# Patient Record
Sex: FEMALE | Race: BLACK OR AFRICAN AMERICAN | Employment: UNEMPLOYED | URBAN - METROPOLITAN AREA
[De-identification: names, ages, dates, MRNs, and addresses within clinical notes are randomized per-mention and may not be internally consistent; named-entity substitution may affect disease eponyms.]

---

## 2021-08-06 PROBLEM — E11.40 DIABETIC NEUROPATHY ASSOCIATED WITH TYPE 2 DIABETES MELLITUS (HCC): Status: ACTIVE | Noted: 2021-08-06

## 2021-08-06 PROBLEM — E11.40 TYPE 2 DIABETES MELLITUS WITH DIABETIC NEUROPATHY, WITHOUT LONG-TERM CURRENT USE OF INSULIN (HCC): Status: ACTIVE | Noted: 2021-08-06

## 2024-03-17 ENCOUNTER — HOSPITAL ENCOUNTER (OUTPATIENT)
Facility: HOSPITAL | Age: 69
Setting detail: OBSERVATION
Discharge: HOME | End: 2024-03-21
Attending: EMERGENCY MEDICINE | Admitting: STUDENT IN AN ORGANIZED HEALTH CARE EDUCATION/TRAINING PROGRAM
Payer: MEDICARE

## 2024-03-17 ENCOUNTER — APPOINTMENT (OUTPATIENT)
Dept: CT IMAGING | Facility: HOSPITAL | Age: 69
End: 2024-03-17
Attending: EMERGENCY MEDICINE
Payer: MEDICARE

## 2024-03-17 DIAGNOSIS — N28.9 RENAL INSUFFICIENCY: ICD-10-CM

## 2024-03-17 DIAGNOSIS — N39.0 URINARY TRACT INFECTION WITHOUT HEMATURIA, SITE UNSPECIFIED: ICD-10-CM

## 2024-03-17 DIAGNOSIS — N20.0 KIDNEY STONE: Primary | ICD-10-CM

## 2024-03-17 LAB
ALBUMIN SERPL-MCNC: 3.8 G/DL (ref 3.4–5)
ALBUMIN/GLOB SERPL: 0.9 {RATIO} (ref 1–2)
ALP LIVER SERPL-CCNC: 98 U/L
ALT SERPL-CCNC: 18 U/L
ANION GAP SERPL CALC-SCNC: 1 MMOL/L (ref 0–18)
AST SERPL-CCNC: 40 U/L (ref 15–37)
BASOPHILS # BLD AUTO: 0.03 X10(3) UL (ref 0–0.2)
BASOPHILS NFR BLD AUTO: 0.3 %
BILIRUB SERPL-MCNC: 1.1 MG/DL (ref 0.1–2)
BILIRUB UR QL STRIP.AUTO: NEGATIVE
BUN BLD-MCNC: 35 MG/DL (ref 9–23)
CALCIUM BLD-MCNC: 9.9 MG/DL (ref 8.5–10.1)
CHLORIDE SERPL-SCNC: 107 MMOL/L (ref 98–112)
CO2 SERPL-SCNC: 26 MMOL/L (ref 21–32)
COLOR UR AUTO: YELLOW
CREAT BLD-MCNC: 1.36 MG/DL
EGFRCR SERPLBLD CKD-EPI 2021: 43 ML/MIN/1.73M2 (ref 60–?)
EOSINOPHIL # BLD AUTO: 0.06 X10(3) UL (ref 0–0.7)
EOSINOPHIL NFR BLD AUTO: 0.7 %
ERYTHROCYTE [DISTWIDTH] IN BLOOD BY AUTOMATED COUNT: 14.3 %
GLOBULIN PLAS-MCNC: 4.4 G/DL (ref 2.8–4.4)
GLUCOSE BLD-MCNC: 103 MG/DL (ref 70–99)
GLUCOSE BLD-MCNC: 97 MG/DL (ref 70–99)
GLUCOSE UR STRIP.AUTO-MCNC: NORMAL MG/DL
HCT VFR BLD AUTO: 43.8 %
HGB BLD-MCNC: 13.8 G/DL
HYALINE CASTS #/AREA URNS AUTO: PRESENT /LPF
IMM GRANULOCYTES # BLD AUTO: 0.04 X10(3) UL (ref 0–1)
IMM GRANULOCYTES NFR BLD: 0.4 %
KETONES UR STRIP.AUTO-MCNC: NEGATIVE MG/DL
LEUKOCYTE ESTERASE UR QL STRIP.AUTO: 500
LYMPHOCYTES # BLD AUTO: 3.7 X10(3) UL (ref 1–4)
LYMPHOCYTES NFR BLD AUTO: 41.1 %
MCH RBC QN AUTO: 26.6 PG (ref 26–34)
MCHC RBC AUTO-ENTMCNC: 31.5 G/DL (ref 31–37)
MCV RBC AUTO: 84.4 FL
MONOCYTES # BLD AUTO: 0.83 X10(3) UL (ref 0.1–1)
MONOCYTES NFR BLD AUTO: 9.2 %
NEUTROPHILS # BLD AUTO: 4.34 X10 (3) UL (ref 1.5–7.7)
NEUTROPHILS # BLD AUTO: 4.34 X10(3) UL (ref 1.5–7.7)
NEUTROPHILS NFR BLD AUTO: 48.3 %
NITRITE UR QL STRIP.AUTO: NEGATIVE
OSMOLALITY SERPL CALC.SUM OF ELEC: 286 MOSM/KG (ref 275–295)
PH UR STRIP.AUTO: 5.5 [PH] (ref 5–8)
PLATELET # BLD AUTO: 288 10(3)UL (ref 150–450)
POTASSIUM SERPL-SCNC: 5.2 MMOL/L (ref 3.5–5.1)
PROT SERPL-MCNC: 8.2 G/DL (ref 6.4–8.2)
PROT UR STRIP.AUTO-MCNC: 50 MG/DL
RBC # BLD AUTO: 5.19 X10(6)UL
RBC #/AREA URNS AUTO: >10 /HPF
SODIUM SERPL-SCNC: 134 MMOL/L (ref 136–145)
SP GR UR STRIP.AUTO: 1.02 (ref 1–1.03)
UROBILINOGEN UR STRIP.AUTO-MCNC: 3 MG/DL
WBC # BLD AUTO: 9 X10(3) UL (ref 4–11)
WBC #/AREA URNS AUTO: >50 /HPF

## 2024-03-17 PROCEDURE — 87186 SC STD MICRODIL/AGAR DIL: CPT | Performed by: PHYSICIAN ASSISTANT

## 2024-03-17 PROCEDURE — 85025 COMPLETE CBC W/AUTO DIFF WBC: CPT

## 2024-03-17 PROCEDURE — 87086 URINE CULTURE/COLONY COUNT: CPT | Performed by: PHYSICIAN ASSISTANT

## 2024-03-17 PROCEDURE — 74177 CT ABD & PELVIS W/CONTRAST: CPT | Performed by: EMERGENCY MEDICINE

## 2024-03-17 PROCEDURE — 87077 CULTURE AEROBIC IDENTIFY: CPT | Performed by: PHYSICIAN ASSISTANT

## 2024-03-17 PROCEDURE — 85025 COMPLETE CBC W/AUTO DIFF WBC: CPT | Performed by: EMERGENCY MEDICINE

## 2024-03-17 PROCEDURE — 80053 COMPREHEN METABOLIC PANEL: CPT

## 2024-03-17 PROCEDURE — 99285 EMERGENCY DEPT VISIT HI MDM: CPT

## 2024-03-17 PROCEDURE — 96365 THER/PROPH/DIAG IV INF INIT: CPT

## 2024-03-17 PROCEDURE — 96361 HYDRATE IV INFUSION ADD-ON: CPT

## 2024-03-17 PROCEDURE — 96375 TX/PRO/DX INJ NEW DRUG ADDON: CPT

## 2024-03-17 PROCEDURE — 81001 URINALYSIS AUTO W/SCOPE: CPT | Performed by: EMERGENCY MEDICINE

## 2024-03-17 PROCEDURE — 82962 GLUCOSE BLOOD TEST: CPT

## 2024-03-17 PROCEDURE — 81001 URINALYSIS AUTO W/SCOPE: CPT

## 2024-03-17 PROCEDURE — 80053 COMPREHEN METABOLIC PANEL: CPT | Performed by: EMERGENCY MEDICINE

## 2024-03-17 RX ORDER — IOHEXOL 350 MG/ML
80 INJECTION, SOLUTION INTRAVENOUS
Status: COMPLETED | OUTPATIENT
Start: 2024-03-17 | End: 2024-03-17

## 2024-03-17 RX ORDER — ONDANSETRON 2 MG/ML
4 INJECTION INTRAMUSCULAR; INTRAVENOUS ONCE
Status: COMPLETED | OUTPATIENT
Start: 2024-03-17 | End: 2024-03-17

## 2024-03-17 RX ORDER — METOCLOPRAMIDE HYDROCHLORIDE 5 MG/ML
10 INJECTION INTRAMUSCULAR; INTRAVENOUS ONCE
Status: COMPLETED | OUTPATIENT
Start: 2024-03-17 | End: 2024-03-17

## 2024-03-17 RX ORDER — DIPHENHYDRAMINE HYDROCHLORIDE 50 MG/ML
25 INJECTION INTRAMUSCULAR; INTRAVENOUS ONCE
Status: COMPLETED | OUTPATIENT
Start: 2024-03-17 | End: 2024-03-17

## 2024-03-18 ENCOUNTER — APPOINTMENT (OUTPATIENT)
Dept: INTERVENTIONAL RADIOLOGY/VASCULAR | Facility: HOSPITAL | Age: 69
End: 2024-03-18
Attending: PHYSICIAN ASSISTANT
Payer: MEDICARE

## 2024-03-18 PROBLEM — N28.9 RENAL INSUFFICIENCY: Status: ACTIVE | Noted: 2024-03-18

## 2024-03-18 PROBLEM — N39.0 URINARY TRACT INFECTION WITHOUT HEMATURIA, SITE UNSPECIFIED: Status: ACTIVE | Noted: 2024-03-18

## 2024-03-18 LAB
ANION GAP SERPL CALC-SCNC: 2 MMOL/L (ref 0–18)
BASOPHILS # BLD AUTO: 0.03 X10(3) UL (ref 0–0.2)
BASOPHILS NFR BLD AUTO: 0.4 %
BUN BLD-MCNC: 29 MG/DL (ref 9–23)
CALCIUM BLD-MCNC: 8.9 MG/DL (ref 8.5–10.1)
CHLORIDE SERPL-SCNC: 114 MMOL/L (ref 98–112)
CO2 SERPL-SCNC: 26 MMOL/L (ref 21–32)
CREAT BLD-MCNC: 1.08 MG/DL
EGFRCR SERPLBLD CKD-EPI 2021: 57 ML/MIN/1.73M2 (ref 60–?)
EOSINOPHIL # BLD AUTO: 0.12 X10(3) UL (ref 0–0.7)
EOSINOPHIL NFR BLD AUTO: 1.7 %
ERYTHROCYTE [DISTWIDTH] IN BLOOD BY AUTOMATED COUNT: 14.3 %
GLUCOSE BLD-MCNC: 75 MG/DL (ref 70–99)
GLUCOSE BLD-MCNC: 89 MG/DL (ref 70–99)
GLUCOSE BLD-MCNC: 89 MG/DL (ref 70–99)
GLUCOSE BLD-MCNC: 90 MG/DL (ref 70–99)
GLUCOSE BLD-MCNC: 91 MG/DL (ref 70–99)
HCT VFR BLD AUTO: 34.8 %
HGB BLD-MCNC: 11.1 G/DL
IMM GRANULOCYTES # BLD AUTO: 0.02 X10(3) UL (ref 0–1)
IMM GRANULOCYTES NFR BLD: 0.3 %
INR BLD: 1.21 (ref 0.8–1.2)
LYMPHOCYTES # BLD AUTO: 2.65 X10(3) UL (ref 1–4)
LYMPHOCYTES NFR BLD AUTO: 36.9 %
MAGNESIUM SERPL-MCNC: 2.2 MG/DL (ref 1.6–2.6)
MCH RBC QN AUTO: 26.9 PG (ref 26–34)
MCHC RBC AUTO-ENTMCNC: 31.9 G/DL (ref 31–37)
MCV RBC AUTO: 84.5 FL
MONOCYTES # BLD AUTO: 0.8 X10(3) UL (ref 0.1–1)
MONOCYTES NFR BLD AUTO: 11.1 %
NEUTROPHILS # BLD AUTO: 3.56 X10 (3) UL (ref 1.5–7.7)
NEUTROPHILS # BLD AUTO: 3.56 X10(3) UL (ref 1.5–7.7)
NEUTROPHILS NFR BLD AUTO: 49.6 %
OSMOLALITY SERPL CALC.SUM OF ELEC: 299 MOSM/KG (ref 275–295)
PLATELET # BLD AUTO: 230 10(3)UL (ref 150–450)
POTASSIUM SERPL-SCNC: 3.8 MMOL/L (ref 3.5–5.1)
PROTHROMBIN TIME: 15.4 SECONDS (ref 11.6–14.8)
RBC # BLD AUTO: 4.12 X10(6)UL
SODIUM SERPL-SCNC: 142 MMOL/L (ref 136–145)
WBC # BLD AUTO: 7.2 X10(3) UL (ref 4–11)

## 2024-03-18 PROCEDURE — 96361 HYDRATE IV INFUSION ADD-ON: CPT

## 2024-03-18 PROCEDURE — 82962 GLUCOSE BLOOD TEST: CPT

## 2024-03-18 PROCEDURE — 96376 TX/PRO/DX INJ SAME DRUG ADON: CPT

## 2024-03-18 PROCEDURE — 99153 MOD SED SAME PHYS/QHP EA: CPT | Performed by: STUDENT IN AN ORGANIZED HEALTH CARE EDUCATION/TRAINING PROGRAM

## 2024-03-18 PROCEDURE — 80048 BASIC METABOLIC PNL TOTAL CA: CPT | Performed by: HOSPITALIST

## 2024-03-18 PROCEDURE — 0T25X0Z CHANGE DRAINAGE DEVICE IN KIDNEY, EXTERNAL APPROACH: ICD-10-PCS | Performed by: STUDENT IN AN ORGANIZED HEALTH CARE EDUCATION/TRAINING PROGRAM

## 2024-03-18 PROCEDURE — 83735 ASSAY OF MAGNESIUM: CPT | Performed by: HOSPITALIST

## 2024-03-18 PROCEDURE — 85610 PROTHROMBIN TIME: CPT | Performed by: STUDENT IN AN ORGANIZED HEALTH CARE EDUCATION/TRAINING PROGRAM

## 2024-03-18 PROCEDURE — 50432 PLMT NEPHROSTOMY CATHETER: CPT | Performed by: STUDENT IN AN ORGANIZED HEALTH CARE EDUCATION/TRAINING PROGRAM

## 2024-03-18 PROCEDURE — 96375 TX/PRO/DX INJ NEW DRUG ADDON: CPT

## 2024-03-18 PROCEDURE — 96366 THER/PROPH/DIAG IV INF ADDON: CPT

## 2024-03-18 PROCEDURE — 85025 COMPLETE CBC W/AUTO DIFF WBC: CPT | Performed by: HOSPITALIST

## 2024-03-18 PROCEDURE — 99152 MOD SED SAME PHYS/QHP 5/>YRS: CPT | Performed by: STUDENT IN AN ORGANIZED HEALTH CARE EDUCATION/TRAINING PROGRAM

## 2024-03-18 RX ORDER — MORPHINE SULFATE 2 MG/ML
2 INJECTION, SOLUTION INTRAMUSCULAR; INTRAVENOUS EVERY 2 HOUR PRN
Status: DISCONTINUED | OUTPATIENT
Start: 2024-03-18 | End: 2024-03-21

## 2024-03-18 RX ORDER — IODIXANOL 320 MG/ML
75 INJECTION, SOLUTION INTRAVASCULAR
Status: COMPLETED | OUTPATIENT
Start: 2024-03-18 | End: 2024-03-18

## 2024-03-18 RX ORDER — LIDOCAINE HYDROCHLORIDE 10 MG/ML
INJECTION, SOLUTION INFILTRATION; PERINEURAL
Status: COMPLETED
Start: 2024-03-18 | End: 2024-03-18

## 2024-03-18 RX ORDER — MORPHINE SULFATE 4 MG/ML
4 INJECTION, SOLUTION INTRAMUSCULAR; INTRAVENOUS EVERY 2 HOUR PRN
Status: DISCONTINUED | OUTPATIENT
Start: 2024-03-18 | End: 2024-03-21

## 2024-03-18 RX ORDER — NICOTINE POLACRILEX 4 MG
30 LOZENGE BUCCAL
Status: DISCONTINUED | OUTPATIENT
Start: 2024-03-18 | End: 2024-03-21

## 2024-03-18 RX ORDER — SENNOSIDES 8.6 MG
17.2 TABLET ORAL NIGHTLY PRN
Status: DISCONTINUED | OUTPATIENT
Start: 2024-03-18 | End: 2024-03-21

## 2024-03-18 RX ORDER — ACETAMINOPHEN 500 MG
1000 TABLET ORAL EVERY 6 HOURS PRN
Status: DISCONTINUED | OUTPATIENT
Start: 2024-03-18 | End: 2024-03-21

## 2024-03-18 RX ORDER — POLYETHYLENE GLYCOL 3350 17 G/17G
17 POWDER, FOR SOLUTION ORAL DAILY PRN
Status: DISCONTINUED | OUTPATIENT
Start: 2024-03-18 | End: 2024-03-21

## 2024-03-18 RX ORDER — BISACODYL 10 MG
10 SUPPOSITORY, RECTAL RECTAL
Status: DISCONTINUED | OUTPATIENT
Start: 2024-03-18 | End: 2024-03-21

## 2024-03-18 RX ORDER — MIDAZOLAM HYDROCHLORIDE 1 MG/ML
INJECTION INTRAMUSCULAR; INTRAVENOUS
Status: COMPLETED
Start: 2024-03-18 | End: 2024-03-18

## 2024-03-18 RX ORDER — DEXTROSE MONOHYDRATE 25 G/50ML
50 INJECTION, SOLUTION INTRAVENOUS
Status: DISCONTINUED | OUTPATIENT
Start: 2024-03-18 | End: 2024-03-21

## 2024-03-18 RX ORDER — METFORMIN HYDROCHLORIDE 500 MG/1
500 TABLET, EXTENDED RELEASE ORAL 2 TIMES DAILY
Status: ON HOLD | COMMUNITY
End: 2024-04-08 | Stop reason: CLARIF

## 2024-03-18 RX ORDER — ONDANSETRON 2 MG/ML
4 INJECTION INTRAMUSCULAR; INTRAVENOUS EVERY 6 HOURS PRN
Status: DISCONTINUED | OUTPATIENT
Start: 2024-03-18 | End: 2024-03-21

## 2024-03-18 RX ORDER — NICOTINE POLACRILEX 4 MG
15 LOZENGE BUCCAL
Status: DISCONTINUED | OUTPATIENT
Start: 2024-03-18 | End: 2024-03-21

## 2024-03-18 RX ORDER — LEVOFLOXACIN 5 MG/ML
INJECTION, SOLUTION INTRAVENOUS
Status: COMPLETED
Start: 2024-03-18 | End: 2024-03-18

## 2024-03-18 RX ORDER — CEPHALEXIN 500 MG/1
500 CAPSULE ORAL EVERY 6 HOURS
COMMUNITY
Start: 2024-03-14 | End: 2024-03-21

## 2024-03-18 RX ORDER — SODIUM CHLORIDE 9 MG/ML
INJECTION, SOLUTION INTRAVENOUS CONTINUOUS
Status: DISCONTINUED | OUTPATIENT
Start: 2024-03-18 | End: 2024-03-21

## 2024-03-18 RX ORDER — ONDANSETRON 4 MG/1
TABLET, ORALLY DISINTEGRATING ORAL
Status: ON HOLD | COMMUNITY
Start: 2024-03-14 | End: 2024-04-08 | Stop reason: CLARIF

## 2024-03-18 RX ORDER — MORPHINE SULFATE 2 MG/ML
1 INJECTION, SOLUTION INTRAMUSCULAR; INTRAVENOUS EVERY 2 HOUR PRN
Status: DISCONTINUED | OUTPATIENT
Start: 2024-03-18 | End: 2024-03-21

## 2024-03-18 RX ORDER — MELATONIN
3 NIGHTLY PRN
Status: DISCONTINUED | OUTPATIENT
Start: 2024-03-18 | End: 2024-03-21

## 2024-03-18 NOTE — CONSULTS
[facility]  Department of Urology   Consultation Note    America Scott Patient Status:  Observation    1955 MRN [id number]   Location [facility] 3NW-A Attending John Branch, DO   Hosp Day # 0 PCP Agnes Sawyer MD     Reason for Consultation:  Right flank pain  Large right staghorn stone    History of Present Illness:  America Scott is a a(n) 68 year old female who presented with right flank pain.  She reports intermittent right flank pain for 3 years.  In addition, she reports nausea, vomiting.  Felt hot but did not measure her temperature.  +chills. No dysuria. Darker urine.  +urinary frequency.      Seen at ER in [place] on Tuesday.      Labs:  WBC 9  Hgb 13.89   Platelet count 288  Serum creat 1.36  Serum calcium level 9.9    UA 3/17/24: >50 WBC/hpf, >10 RBC/hpf, 1+ bacteria, moderate squamous epithelial cells    Abdominal/pelvic CT scan with contrast 3/17/24:  Large staghorn calculus in the right renal pelvis and mid superior and inferior pole calices measuring 6.2 x 2.7 cm with moderate caliectasis involving the right kidney     Urology was consulted.    Patient has known about kidney stone for over 3 years.    Seen by urologist 2019 - discussed stone treatment.    Remote history of UTI  +FH of urolithiasis - sister.     History:  Past Medical History:   Diagnosis Date    Essential hypertension      History reviewed. No pertinent surgical history.  History reviewed. No pertinent family history.   reports that she has been smoking cigarettes. She has a 26.3 pack-year smoking history. She has never used smokeless tobacco. She reports that she does not currently use alcohol. She reports that she does not currently use drugs.    Allergies:  No Known Allergies    Medications:    Current Facility-Administered Medications:     sodium chloride 0.9% infusion, , Intravenous, Continuous    acetaminophen (Tylenol Extra Strength) tab 1,000 mg, 1,000 mg, Oral, Q6H PRN     morphINE PF 2 MG/ML injection 1 mg, 1 mg, Intravenous, Q2H PRN **OR** morphINE PF 2 MG/ML injection 2 mg, 2 mg, Intravenous, Q2H PRN **OR** morphINE PF 4 MG/ML injection 4 mg, 4 mg, Intravenous, Q2H PRN    melatonin tab 3 mg, 3 mg, Oral, Nightly PRN    polyethylene glycol (PEG 3350) (Miralax) 17 g oral packet 17 g, 17 g, Oral, Daily PRN    sennosides (Senokot) tab 17.2 mg, 17.2 mg, Oral, Nightly PRN    bisacodyl (Dulcolax) 10 MG rectal suppository 10 mg, 10 mg, Rectal, Daily PRN    ondansetron (Zofran) 4 MG/2ML injection 4 mg, 4 mg, Intravenous, Q6H PRN    cefTRIAXone (Rocephin) 1 g in D5W 100 mL IVPB-ADD, 1 g, Intravenous, Q24H    Review of Systems:  Pertinent items are noted in HPI.  10 point review of systems completed.    Physical Exam:  /44 (BP Location: Left arm)   Pulse 64   Temp 98 °F (36.7 °C) (Oral)   Resp 18   Wt 141 lb 6.4 oz (64.1 kg)   SpO2 98%   BMI 24.27 kg/m²   GENERAL:  the patient is resting in bed in no acute distress.    HEENT: Unremarkable.  NECK: Supple.   LUNGS: non-labored respirations.   ABDOMEN: The abdomen is soft and nontender without rebound or guarding.  The bladder is non-palpable.    BACK: Without CVA tenderness.   SKIN: Without stigmata.   NEUROLOGIC: Grossly intact.  EXTREMITIES: Without edema.    Laboratory Data:  Lab Results   Component Value Date    WBC 9.0 03/17/2024    HGB 13.8 03/17/2024    HCT 43.8 03/17/2024    .0 03/17/2024    CREATSERUM 1.36 03/17/2024    BUN 35 03/17/2024     03/17/2024    K 5.2 03/17/2024     03/17/2024    CO2 26.0 03/17/2024     03/17/2024    CA 9.9 03/17/2024    ALB 3.8 03/17/2024    ALKPHO 98 03/17/2024    BILT 1.1 03/17/2024    TP 8.2 03/17/2024    AST 40 03/17/2024    ALT 18 03/17/2024    PGLU 89 03/18/2024        UA 3/17/24: >50 WBC/hpf, >10 RBC/hpf, 1+ bacteria, moderate squamous epithelial cells    Imaging:    CT ABDOMEN+PELVIS(CONTRAST ONLY)(CPT=74177)    Result Date: 3/17/2024  CONCLUSION:  Large staghorn  calculus in the right kidney with partial obstruction and mild to  moderate hydronephrosis involving the superior middle and inferior pole calices of the right kidney.  Normal caliber appendix.  Colonic diverticulosis.  LOCATION:  [facility]   Dictated by (CST): Lucila Kyle MD on 3/17/2024 at 10:53 PM     Finalized by (CST): Lucila Kyle MD on 3/17/2024 at 10:57 PM       Impression:  Patient Active Problem List   Diagnosis    Type 2 diabetes mellitus with diabetic neuropathy, without long-term current use of insulin (HCC)    Kidney stone    Renal insufficiency    Urinary tract infection without hematuria, site unspecified       RIGHT FLANK PAIN, LARGE STAGHORN STONE IN RIGHT KIDNEY WITH PARTIAL OBSTRUCTION AND MILD TO MODERATE HYDRONEPHROSIS INVOLVING MIDDLE AND INFERIOR POLE CALICES OF RIGHT KIDNEY (6.2 x 2.7 cm)  Afebrile, VSS  Abnormal UA  No leukocytosis  Serum creat 1.36  Serum calcium level 9.9    Recommendations:  Urine sample from admission to be cultured - order placed.  Dr. Martinez reviewed CT scan - recommend proceed with right NT placement since the patient will need staged right PCNL in the future.  Reviewed risks, benefits, alternatives, and complications of the procedure with the patient who understands.  Patient wishes to have this writer contact her daughter - reviewed above recommendations with daughter.  Daughter wishes to review with other family members.   Nurse to contact urology with patient's decision analysis.     NPO until a decision has been made    In the interim, continue with IV fluids, pain management, abx.  Follow labs, temp    Above discussed with patient, nurse, Dr. Martinez.      Thank you for allowing me to participate in the care of your patient.    Desi Rojas PA-C  [facility]  Department of Urology  3/18/2024  6:11 AM    Addendum:  Spoke with nurse - patient wishes to proceed with right NT placement.      Will update Dr. Martinez.    DEEDEE Genao  Urology

## 2024-03-18 NOTE — H&P
[facility] Hospitalist H&P       CC:   Chief Complaint   Patient presents with    Nausea/Vomiting/Diarrhea        PCP: Agnes Sawyer MD    History of Present Illness: Patient is a 68 year old female with PMH sig for hypertension who presented to the ER for evaluation of right flank pain.  Patient states that she has been having pain in her belly for the past few days, the pain would not tolerate, worse 10 intensity.  She she also experienced nausea and vomiting because of the pain.  She denies any fevers chills urinary symptoms.  No previous episodes of pain similar to this.  In the ER, patient was hemodynamically stable.  Labs concerning for CISCO, CT abdomen pelvis was obtained which was abnormal.  Patient was admitted for further workup and management.      PMH  Past Medical History:   Diagnosis Date    Essential hypertension         PSH  History reviewed. No pertinent surgical history.     ALL:  No Known Allergies     Home Medications:  Outpatient Medications Marked as Taking for the 3/17/24 encounter (Hospital Encounter)   Medication Sig Dispense Refill    ondansetron 4 MG Oral Tablet Dispersible DISSOLVE 1 TABLET IN MOUTH EVERY 6 HOURS           Soc Hx  Social History     Tobacco Use    Smoking status: Every Day     Packs/day: 0.75     Years: 35.00     Additional pack years: 0.00     Total pack years: 26.25     Types: Cigarettes    Smokeless tobacco: Never   Substance Use Topics    Alcohol use: Not Currently        Fam Hx  History reviewed. No pertinent family history.    Review of Systems  Comprehensive ROS reviewed and negative except for what's stated above.     OBJECTIVE:  /44 (BP Location: Left arm)   Pulse 64   Temp 98 °F (36.7 °C) (Oral)   Resp 18   Wt 141 lb 6.4 oz (64.1 kg)   SpO2 98%   BMI 24.27 kg/m²   General:  Alert, no distress   Head:  Normocephalic, without obvious abnormality, atraumatic.   Eyes:  Sclera anicteric, No conjunctival pallor, EOMs intact.    Nose: Nares normal. Septum  midline. Mucosa normal. No drainage.   Throat: Lips, mucosa, and tongue normal. Teeth and gums normal.   Neck: Supple, symmetrical, trachea midline,   Lungs:   Clear to auscultation bilaterally. Normal effort   Chest wall:  No tenderness or deformity.   Heart:  Regular rate and rhythm, S1, S2 normal, no murmur, rub or gallop appreciated   Abdomen:   Soft, tender in the right flank bowel sounds normal. No masses,  Non distended, no CVA tenderness   Extremities: Extremities normal, atraumatic, no cyanosis or edema.   Skin: Skin color, texture, turgor normal. No rashes or lesions.    Neurologic: Normal strength, no focal deficit appreciated     Diagnostic Data:    CBC/Chem  Recent Labs   Lab 03/17/24 2132 03/18/24  0634   WBC 9.0 7.2   HGB 13.8 11.1*   MCV 84.4 84.5   .0 230.0       Recent Labs   Lab 03/17/24 2132 03/18/24  0634   * 142   K 5.2* 3.8    114*   CO2 26.0 26.0   BUN 35* 29*   CREATSERUM 1.36* 1.08*   * 90   CA 9.9 8.9   MG  --  2.2       Recent Labs   Lab 03/17/24 2132   ALT 18   AST 40*   ALB 3.8       No results for input(s): \"TROP\" in the last 168 hours.    CXR: image personally reviewed     Radiology: CT ABDOMEN+PELVIS(CONTRAST ONLY)(CPT=74177)    Result Date: 3/17/2024  CONCLUSION:  Large staghorn calculus in the right kidney with partial obstruction and mild to  moderate hydronephrosis involving the superior middle and inferior pole calices of the right kidney.  Normal caliber appendix.  Colonic diverticulosis.  LOCATION:  [facility]   Dictated by (CST): Lucila Kyle MD on 3/17/2024 at 10:53 PM     Finalized by (CST): Lucila Kyle MD on 3/17/2024 at 10:57 PM          ASSESSMENT / PLAN:     Patient is a 68 year old female with PMH sig for hypertension who presented to the ER for evaluation of right flank pain.    Right flank pain  Nephrolithiasis with hydronephrosis, right  Probable UTI  - CT abdomen pelvis concerning for large staghorn calculi in the right kidney with  mild to moderate hydronephrosis  - IVF  - IV pain medications  - UA grossly abnormal, await cultures, empiric ceftriaxone 3/17-  - Urology on consult, plan for intervention this afternoon, recommendations reviewed    CISCO  - Likely postrenal given hydronephrosis  - Creatinine 1.36 on presentation,  -Plan for intervention to relieve obstruction later today  - IVF, follow BMP    Hypertension  -Takes lisinopril, hold given CISCO    Type 2 diabetes with hyperglycemia  - Accu-Cheks, ISS, hold metformin    FN:  - IVF: 0.9 NS  - Diet: N.p.o.    DVT Prophy: SCDs  Atrophy: Ambulate as tolerated  Lines: PIV    Dispo: pending clinical course    Outpatient records or previous hospital records reviewed.     Further recommendations pending patient's clinical course.  [facility] hospitalist to continue to follow patient while in house    Patient and/or patient's family given opportunity to ask questions and note understanding and agreeing with therapeutic plan as outlined    Thank You,  DO [place] Hospitalist  Answering Service number: [phone]

## 2024-03-18 NOTE — ED INITIAL ASSESSMENT (HPI)
NVD since Tuesday. Seen at another facility on Thursday for the same. Dx with UTI. But not taking antibiotics.

## 2024-03-18 NOTE — PROGRESS NOTES
1700 - Pt back w/ R NT tube intact, ss output noted.    1530 - Pt down to IR.     Upon assessment, A&Ox4. RA. SCDs. NPO. Pt c/o nausea, vomiting, diarrhea, and constipation. Last BM 3/12. Mds aware. Voids. Flank pain managed per MAR. SBA, cane from home at bedside. IVF infusing. Denies SOB, CP, and lightheadedness. POC discussed, all questions answered and concerns addressed. Safety precautions in place. Frequent rounds performed.

## 2024-03-18 NOTE — PROGRESS NOTES
NURSING ADMISSION NOTE      Patient admitted via Cart  Oriented to room.  Safety precautions initiated.  Bed in low position.  Call light in reach.

## 2024-03-18 NOTE — ED QUICK NOTES
Orders for admission, patient is aware of plan and ready to go upstairs. Any questions, please call ED RN Darcie at extension [phone].     Patient Covid vaccination status: Fully vaccinated     COVID Test Ordered in ED: None    COVID Suspicion at Admission: N/A    Running Infusions:  None    Mental Status/LOC at time of transport: AOx4    Other pertinent information: Uses cane to ambulate  CIWA score: N/A   NIH score:  N/A

## 2024-03-18 NOTE — ED PROVIDER NOTES
Patient Seen in: [facility] Emergency Department      History     Chief Complaint   Patient presents with    Nausea/Vomiting/Diarrhea     Stated Complaint: N/V, seen at [facility] on thursday    Subjective:   HPI    68-year-old female presenting to the emergency department for vomiting diarrhea that began almost a week ago she ended up going Thursday to an outlying emergency department had some tests run sent home with Zofran she says she still continues to have some vomiting diarrhea prompting her visit.  She denies any sort of abdominal pain chest pain or any other exacerbating factors or associated symptoms    Objective:   Past Medical History:   Diagnosis Date    Essential hypertension               History reviewed. No pertinent surgical history.             Social History     Socioeconomic History    Marital status: Single   Tobacco Use    Smoking status: Every Day     Packs/day: 0.75     Years: 35.00     Additional pack years: 0.00     Total pack years: 26.25     Types: Cigarettes    Smokeless tobacco: Never   Vaping Use    Vaping Use: Never used   Substance and Sexual Activity    Alcohol use: Not Currently    Drug use: Not Currently              Review of Systems    Positive for stated complaint: N/V, seen at [facility] on thursday  Other systems are as noted in HPI.  Constitutional and vital signs reviewed.      All other systems reviewed and negative except as noted above.    Physical Exam     ED Triage Vitals   BP 03/17/24 2112 101/71   Pulse 03/17/24 2109 84   Resp 03/17/24 2109 16   Temp 03/17/24 2109 98 °F (36.7 °C)   Temp src 03/17/24 2109 Temporal   SpO2 03/17/24 2109 99 %   O2 Device 03/17/24 2109 None (Room air)       Current:/65   Pulse 85   Temp 98 °F (36.7 °C) (Temporal)   Resp 21   Wt 68 kg   SpO2 100%   BMI 25.73 kg/m²         Physical Exam  Awake alert patient appears no distress HEENT exam is normal lungs are clear cardiovascular exam regular rhythm abdomen soft  nontender extremities no COVID cyanosis or edema no rash back exam is normal skin is nondiaphoretic       ED Course     Labs Reviewed   URINALYSIS, ROUTINE - Abnormal; Notable for the following components:       Result Value    Clarity Urine Turbid (*)     Blood Urine 1+ (*)     Protein Urine 50 (*)     Urobilinogen Urine 3 (*)     Leukocyte Esterase Urine 500 (*)     WBC Urine >50 (*)     RBC Urine >10 (*)     Bacteria Urine 1+ (*)     Squamous Epi. Cells Moderate (*)     Hyaline Casts Present (*)     All other components within normal limits   COMP METABOLIC PANEL (14) - Abnormal; Notable for the following components:    Glucose 103 (*)     Sodium 134 (*)     Potassium 5.2 (*)     BUN 35 (*)     Creatinine 1.36 (*)     eGFR-Cr 43 (*)     AST 40 (*)     A/G Ratio 0.9 (*)     All other components within normal limits   POCT GLUCOSE - Normal   CBC WITH DIFFERENTIAL WITH PLATELET    Narrative:     The following orders were created for panel order CBC With Differential With Platelet.  Procedure                               Abnormality         Status                     ---------                               -----------         ------                     CBC W/ DIFFERENTIAL[[id number]]                              Final result                 Please view results for these tests on the individual orders.   CBC W/ DIFFERENTIAL             Differential diagnosis includes obstruction, perforation         MDM        Admission disposition: 3/17/2024 11:32 PM                                        Medical Decision Making  68-year-old female presenting emerged from for vomiting diarrhea no fevers IV established cardiac cardiac monitor shows sinus rhythm pulse ox shows no signs of hypoxia.  CBC shows a stable hemoglobin level metabolic panel shows a renal sufficiency and hyperkalemia.  This was addressed with IV fluids here urinalysis shows possible UTI and CT scan independent interpretation by ED physician shows mild  hydronephrosis Staghorn calculus patient was ordered antibiotics IV fluids will be admitted at this time  This note was prepared using Dragon Medical voice recognition dictation software.  As a result errors may occur.  When identified to these areas have been corrected.  While every attempt is made to correct errors during dictation discrepancies may still exist.  Please contact if there are any errors    Problems Addressed:  Kidney stone: acute illness or injury  Renal insufficiency: acute illness or injury  Urinary tract infection without hematuria, site unspecified: acute illness or injury    Amount and/or Complexity of Data Reviewed  Labs: ordered. Decision-making details documented in ED Course.  Radiology: ordered and independent interpretation performed. Decision-making details documented in ED Course.  ECG/medicine tests: ordered and independent interpretation performed. Decision-making details documented in ED Course.        Disposition and Plan     Clinical Impression:  1. Kidney stone    2. Renal insufficiency    3. Urinary tract infection without hematuria, site unspecified         Disposition:  Admit  3/17/2024 11:32 pm    Follow-up:  No follow-up provider specified.        Medications Prescribed:  Current Discharge Medication List                            Hospital Problems       Present on Admission  Date Reviewed: 3/17/2024            ICD-10-CM Noted POA    * (Principal) Kidney stone N20.0 3/17/2024 Unknown

## 2024-03-19 LAB
ANION GAP SERPL CALC-SCNC: 4 MMOL/L (ref 0–18)
BASOPHILS # BLD AUTO: 0.02 X10(3) UL (ref 0–0.2)
BASOPHILS NFR BLD AUTO: 0.2 %
BUN BLD-MCNC: 19 MG/DL (ref 9–23)
CALCIUM BLD-MCNC: 9.2 MG/DL (ref 8.5–10.1)
CHLORIDE SERPL-SCNC: 114 MMOL/L (ref 98–112)
CO2 SERPL-SCNC: 22 MMOL/L (ref 21–32)
CREAT BLD-MCNC: 0.8 MG/DL
EGFRCR SERPLBLD CKD-EPI 2021: 81 ML/MIN/1.73M2 (ref 60–?)
EOSINOPHIL # BLD AUTO: 0.01 X10(3) UL (ref 0–0.7)
EOSINOPHIL NFR BLD AUTO: 0.1 %
ERYTHROCYTE [DISTWIDTH] IN BLOOD BY AUTOMATED COUNT: 14.3 %
GLUCOSE BLD-MCNC: 124 MG/DL (ref 70–99)
GLUCOSE BLD-MCNC: 137 MG/DL (ref 70–99)
GLUCOSE BLD-MCNC: 181 MG/DL (ref 70–99)
GLUCOSE BLD-MCNC: 193 MG/DL (ref 70–99)
GLUCOSE BLD-MCNC: 222 MG/DL (ref 70–99)
GLUCOSE BLD-MCNC: 64 MG/DL (ref 70–99)
GLUCOSE BLD-MCNC: 67 MG/DL (ref 70–99)
GLUCOSE BLD-MCNC: 78 MG/DL (ref 70–99)
GLUCOSE BLD-MCNC: 80 MG/DL (ref 70–99)
GLUCOSE BLD-MCNC: 84 MG/DL (ref 70–99)
GLUCOSE BLD-MCNC: 85 MG/DL (ref 70–99)
HCT VFR BLD AUTO: 36.2 %
HGB BLD-MCNC: 11.5 G/DL
IMM GRANULOCYTES # BLD AUTO: 0.03 X10(3) UL (ref 0–1)
IMM GRANULOCYTES NFR BLD: 0.3 %
LYMPHOCYTES # BLD AUTO: 1.59 X10(3) UL (ref 1–4)
LYMPHOCYTES NFR BLD AUTO: 18.2 %
MCH RBC QN AUTO: 26.6 PG (ref 26–34)
MCHC RBC AUTO-ENTMCNC: 31.8 G/DL (ref 31–37)
MCV RBC AUTO: 83.8 FL
MONOCYTES # BLD AUTO: 0.56 X10(3) UL (ref 0.1–1)
MONOCYTES NFR BLD AUTO: 6.4 %
NEUTROPHILS # BLD AUTO: 6.52 X10 (3) UL (ref 1.5–7.7)
NEUTROPHILS # BLD AUTO: 6.52 X10(3) UL (ref 1.5–7.7)
NEUTROPHILS NFR BLD AUTO: 74.8 %
OSMOLALITY SERPL CALC.SUM OF ELEC: 291 MOSM/KG (ref 275–295)
PLATELET # BLD AUTO: 233 10(3)UL (ref 150–450)
POTASSIUM SERPL-SCNC: 4.1 MMOL/L (ref 3.5–5.1)
RBC # BLD AUTO: 4.32 X10(6)UL
SODIUM SERPL-SCNC: 140 MMOL/L (ref 136–145)
WBC # BLD AUTO: 8.7 X10(3) UL (ref 4–11)

## 2024-03-19 PROCEDURE — 85025 COMPLETE CBC W/AUTO DIFF WBC: CPT | Performed by: STUDENT IN AN ORGANIZED HEALTH CARE EDUCATION/TRAINING PROGRAM

## 2024-03-19 PROCEDURE — 96376 TX/PRO/DX INJ SAME DRUG ADON: CPT

## 2024-03-19 PROCEDURE — 96375 TX/PRO/DX INJ NEW DRUG ADDON: CPT

## 2024-03-19 PROCEDURE — 96361 HYDRATE IV INFUSION ADD-ON: CPT

## 2024-03-19 PROCEDURE — 82962 GLUCOSE BLOOD TEST: CPT

## 2024-03-19 PROCEDURE — 80048 BASIC METABOLIC PNL TOTAL CA: CPT | Performed by: STUDENT IN AN ORGANIZED HEALTH CARE EDUCATION/TRAINING PROGRAM

## 2024-03-19 PROCEDURE — 96366 THER/PROPH/DIAG IV INF ADDON: CPT

## 2024-03-19 RX ORDER — DEXTROSE AND SODIUM CHLORIDE 5; .9 G/100ML; G/100ML
INJECTION, SOLUTION INTRAVENOUS CONTINUOUS
Status: DISCONTINUED | OUTPATIENT
Start: 2024-03-19 | End: 2024-03-21

## 2024-03-19 NOTE — DISCHARGE INSTRUCTIONS
Doctor is recommending a surgery called a PCNL (percutaneous nephrolithotomy)    You'll receive a call from our surgery schedulers to set up a date, time location and review what testing or appointments you may need prior to surgery.  If you do not hear from someone in the next day or two, please call [phone] and ask to speak to a surgery scheduler.    COMPLETE ANTIBIOTICS AND RESTART ANTIBIOTICS 2 DAYS PRIOR TO SURGERY FOR A TOTAL OF 5 DAYS    Please write in the date, time and location of your surgery:    ________________________________________________________________________________    What is this surgery?  A PCNL (percutaneous nephrolithotomy) is a surgical procedure which allows physicians to remove large kidney stones from the kidney.    A kidney stone is a hard, stone-like mass (also call a calculus) developed from tiny crystals that form in the urine. Stones form from these small crystals, which attach to the inner surfaces of the kidney, grow into larger crystals and eventually become stones.    What is the Purpose?  The purpose of a PCNL is to remove renal calculi in order to relieve pain, bleeding into or obstruction of the urinary tract, and/or urinary tract infections resulting from blockages.     How It Is Done  A nephrostomy tube or wire is placed by the radiologist prior to the surgery.       You will be placed under general anesthesia.  The physician will make a one-centimeter incision in your back or side of your abdomen. A nephroscope (an instrument with a fiberoptic light source used to view the kidney) is passed through the incision to the stone location. The physician may use a device with a basket on the end to grasp and remove smaller kidney stones directly. Larger stones are broken up with an ultrasonic or electrohydraulic probe, or a holmium laser lithotriptor. The physician may place a stent (a narrow, long flexible plastic tube) in the ureter in order to hold the ureter open and  allow urine to pass more easily out of the body.  A catheter is placed to drain the urinary system through the bladder and a nephrostomy tube is placed in the incision in the back to carry fluid from the kidney into a drainage bag. The catheter is removed after 24 hours. The nephrostomy tube is usually removed while the patient is still in the hospital but may be left in after the patient is discharged. The procedure takes approximately 1-3 hours and you will remain in the hospital for 1-2 nights after the procedure.     What to Expect After Surgery  Mild to moderate bleeding in the urine is common after PCNL. Patients often have a mild to moderate amount of pain at the site of the nephrostomy tube which is easily managed with oral pain medications. If you have a ureteral stent in place, you may also experience some burning with urination, urgency, or increased urinary frequency.  Drainage of urine from the incision is typical for several days.    Risks Associated with Procedure  Bleeding  Infection  Need for re-treatment    What to Do After Your Procedure  Drink plenty of fluids, 10-12 (8 oz) glasses of water per day, unless given specific restrictions.  Take your pain medications as prescribed, when needed. These medications may cause constipation.  Avoid straining and constipation. You can prevent constipation by drinking fluids and adding fruit and vegetables to your diet. You may also add stool softners as needed to prevent constipation.  If prescribed additional medications (i.e. Rapaflo, Flomax, or Uroxatral) take as directed. Finish/discard antibiotic prescription given prior to procedure  You may resume any prescription medications, but DO NOT take any aspirin, ibuprofen or blood thinners until approved by your physician.  Ambulate often, but rest when you become tired.    Activity Restrictions  You may resume normal activities as tolerated. Walking and moving about aids in the passage of some stone  particles. However, avoid sports or really strenuous exercise until there is no blood in your urine, or when physician approves.  Do not lift more than 15 lbs for 4-6 weeks  You may shower. Do not soak your incision in a hot tub or bath tub until it is healed.  Do not drive for 2 weeks; this should be at least four days after the last dose of pain medication.    When to Call Your Doctor  You cannot pass urine.  Your urine becomes so bloody that you cannot see through it.  Your fever is over 100.5° F (orally) for two readings taken 4 hours apart.  You have severe burning, pain, and irritation with urination.  Your pain is unrelieved with pain medication.  You have persistent nausea or vomiting.    Follow-Up Care:    Please call to schedule your follow up appointment the day you come home from surgery.    Date of post-op appointment (1-2 weeks after procedure):    __________________________________________________________________________    You will be instructed upon discharge when to follow-up with your physician. If you have a stent placed or nephrostomy tube you will be given instructions when it is to be removed. In some cases you will be able to remove the stent yourself at home or you will be required to schedule an in-office procedure in which the stent will be removed. At the time of your follow-up appointment, an x-ray will be performed in order to evaluate the success of the procedure. Please arrive 10-15 minutes early in order to complete the x-ray before seeing your physician.  Note: Some stents have a thread/string that may be seen coming from the urethra. DO NOT pull or dislodge the string. If you notice increased leakage of urine, notify your physician.    Thank you for letting us be involved in your healthcare.    Sometimes managing your health at home requires assistance.  The [facility] team has recognized your preference to use [facility]. They can be reached by phone at  [phone]. The fax number for your reference is [phone].  A representative from the home health agency will contact you or your family to schedule your first visit.

## 2024-03-19 NOTE — PROGRESS NOTES
Assumed care this AM. AxO x4. RA. R nephrostomy tube, flushed with 10 mL saline, output clear, yellow, some old blood in tubing. C/o nausea, prn anti-emetics. IVF. IV abx. Pain mgmt. Voids. Poor appetite. Up SBA with cane.

## 2024-03-19 NOTE — PLAN OF CARE
A&Ox4. VSS. RA. . Denies chest pain and SOB.   GI: Abdomen soft, nondistended. Passing gas.  Zofran given for nausea. One episode of emesis.   : Voids in the bathroom.   Pain controlled with PRN pain medications. IV Morphine.   Up with standby assist and a walker or cane.   Drains: R nephro tube to leg bag draining cherry red fluid.   Diet: Carb controlled diet.   IVF running per order with IV Rocephin.   All appropriate safety measures in place. All questions and concerns addressed.

## 2024-03-19 NOTE — PROGRESS NOTES
[facility]   part of [facility] Hospitalist Progress Note     America Scott Patient Status:  Observation    1955 MRN [id number]   Location [facility] 3NW-A Attending Antonio Mcnamara DO   Hosp Day # 0 PCP Agnes Sawyer MD     CC:       Chief Complaint   Patient presents with    FU: Nausea/Vomiting/Diarrhea       Subjective:     Patient seen and examined.   No issues overngiht  Says she feels ok but rates pain as 5-6/10  Some nausea  Afebrile      Objective:    Review of Systems:   10 point ROS completed and was negative, except for pertinent positive and negatives stated in subjective.    Vital signs:  Temp:  [98.2 °F (36.8 °C)-98.4 °F (36.9 °C)] 98.4 °F (36.9 °C)  Pulse:  [70-90] 90  Resp:  [18] 18  BP: (109-158)/(45-73) 158/73  SpO2:  [96 %-100 %] 100 %    Physical Exam:    General: No acute distress.   HEENT:  EOMI, PERRLA, OP clear  Respiratory: Clear to auscultation bilaterally. No wheezes. No rhonchi.  Cardiovascular: S1, S2. Regular rate and rhythm. No murmurs.  Abdomen: Soft, nontender, nondistended.  Positive bowel sounds. No rebound or guarding.  Extremities: No edema.  Neuro:  Grossly non focal, no motor deficits.        Diagnostic Data:    Labs:  Recent Labs   Lab 24  0634 24  1051   WBC 9.0 7.2  --    HGB 13.8 11.1*  --    MCV 84.4 84.5  --    .0 230.0  --    INR  --   --  1.21*       Recent Labs   Lab 242 24  0634   * 90   BUN 35* 29*   CREATSERUM 1.36* 1.08*   CA 9.9 8.9   ALB 3.8  --    * 142   K 5.2* 3.8    114*   CO2 26.0 26.0   ALKPHO 98  --    AST 40*  --    ALT 18  --    BILT 1.1  --    TP 8.2  --        Estimated Creatinine Clearance: 44.2 mL/min (A) (based on SCr of 1.08 mg/dL (H)).    Recent Labs   Lab 24  1051   PTP 15.4*   INR 1.21*            COVID-19 Lab Results    COVID-19  No results found for: \"COVID19\"    Pro-Calcitonin  No results for input(s): \"PCT\" in the  last 168 hours.    Cardiac  No results for input(s): \"TROP\", \"PBNP\" in the last 168 hours.    Creatinine Kinase  No results for input(s): \"CK\" in the last 168 hours.    Inflammatory Markers  No results for input(s): \"CRP\", \"EASTON\", \"LDH\", \"DDIMER\" in the last 168 hours.    Imaging: Imaging data reviewed in Epic.    Medications:    cefTRIAXone  1 g Intravenous Q24H    insulin aspart  1-5 Units Subcutaneous TID AC and HS       Assessment & Plan:      Patient is a 68 year old female with PMH sig for hypertension who presented to the ER for evaluation of right flank pain.     Right flank pain  Nephrolithiasis with hydronephrosis, right s/p R NT placement with IR 3/18/24  Probable UTI  - CT abdomen pelvis concerning for large staghorn calculi in the right kidney with mild to moderate hydronephrosis  - IVF  - IV pain medications  - UA grossly abnormal, await cultures, empiric ceftriaxone 3/17-  - Urology on consult >>  recommendations reviewed     CISCO  - Likely postrenal given hydronephrosis  - Creatinine 1.36 on presentation,  -Plan for intervention to relieve obstruction later today  - IVF, follow BMP     Hypertension  -Takes lisinopril, hold given CISCO     Type 2 diabetes with hyperglycemia  - Accu-Cheks, ISS, hold metformin     FN:  - IVF: 0.9 NS  - Diet: N.p.o.     DVT Prophy: SCDs  Atrophy: Ambulate as tolerated  Lines: PIV     DISPO:  pending clinical course  Urology following       Margarita Quevedo Hospitalist  Pager [phone]  Answering Service number: [phone]

## 2024-03-19 NOTE — PROGRESS NOTES
[facility]  Urology Progress Note    America Scott Patient Status:  Observation    1955 MRN [id number]   Location [facility] 3NW-A Attending Antonio Mcnamara,    Hosp Day # 0 PCP Agnes Sawyer MD     Subjective:  America Scott is a(n) 68 year old female.    S/P right NT placement 3/18/24 with IR    Current complaints: Pain controlled.  +nausea/vomiting overnight. Voiding ok.      Objective:  General appearance: alert, appears stated age, and cooperative  Blood pressure 158/73, pulse 90, temperature 98.4 °F (36.9 °C), temperature source Oral, resp. rate 18, height 5' 4\" (1.626 m), weight 141 lb 6.4 oz (64.1 kg), SpO2 100%, not currently breastfeeding.  Lungs: non-labored respirations  Abdomen: soft, non-tender  Right NT in place draining blood tinged urine.    Lab Results   Component Value Date    WBC 7.2 2024    HGB 11.1 2024    HCT 34.8 2024    .0 2024    CREATSERUM 1.08 2024    BUN 29 2024     2024    K 3.8 2024     2024    CO2 26.0 2024    GLU 90 2024    CA 8.9 2024    INR 1.21 2024    PTP 15.4 2024    MG 2.2 2024    PGLU 84 2024       No results found for this visit on 24.     IR NEPHROSTOMY TUBE    Result Date: 3/18/2024  CONCLUSION: Successful placement of right 8.5 [place]occan nephrostomy tube  LOCATION:  [place]    Dictated by (CST): Lalit Ramirez MD on 3/18/2024 at 5:03 PM     Finalized by (CST): Lalit Ramirez MD on 3/18/2024 at 5:05 PM       CT ABDOMEN+PELVIS(CONTRAST ONLY)(CPT=74177)    Result Date: 3/17/2024  CONCLUSION:  Large staghorn calculus in the right kidney with partial obstruction and mild to  moderate hydronephrosis involving the superior middle and inferior pole calices of the right kidney.  Normal caliber appendix.  Colonic diverticulosis.  LOCATION:  [place]   Dictated by (CST): Lucila Kyle MD on 3/17/2024 at 10:53 PM     Finalized by (CST): Anabella  MD Lucila on 3/17/2024 at 10:57 PM         Assessment:    RIGHT FLANK PAIN, LARGE STAGHORN STONE IN RIGHT KIDNEY WITH PARTIAL OBSTRUCTION AND MILD TO MODERATE HYDRONEPHROSIS INVOLVING MIDDLE AND INFERIOR POLE CALICES OF RIGHT KIDNEY (6.2 x 2.7 cm)   Status-post right NT placement 3/18/24 with IR  Afebrile, VSS  Abnormal UA.  Urine culture pending. On abx.    No leukocytosis  Serum creat 1.36  Serum calcium level 9.9  Repeat labs to be collected    Plan:    Check final urine culture results  Abx per attending  Follow labs, temp  CPM with right NT to gravity drainage  Future staged right PCNL    Desi Rojas PA-C  [facility]  Department of Urology  3/19/2024  5:52 AM

## 2024-03-20 LAB
GLUCOSE BLD-MCNC: 100 MG/DL (ref 70–99)
GLUCOSE BLD-MCNC: 137 MG/DL (ref 70–99)
GLUCOSE BLD-MCNC: 76 MG/DL (ref 70–99)
GLUCOSE BLD-MCNC: 85 MG/DL (ref 70–99)

## 2024-03-20 PROCEDURE — 96366 THER/PROPH/DIAG IV INF ADDON: CPT

## 2024-03-20 PROCEDURE — 96375 TX/PRO/DX INJ NEW DRUG ADDON: CPT

## 2024-03-20 PROCEDURE — 82962 GLUCOSE BLOOD TEST: CPT

## 2024-03-20 PROCEDURE — 97161 PT EVAL LOW COMPLEX 20 MIN: CPT

## 2024-03-20 PROCEDURE — 96361 HYDRATE IV INFUSION ADD-ON: CPT

## 2024-03-20 PROCEDURE — 96376 TX/PRO/DX INJ SAME DRUG ADON: CPT

## 2024-03-20 PROCEDURE — 97116 GAIT TRAINING THERAPY: CPT

## 2024-03-20 RX ORDER — HYDRALAZINE HYDROCHLORIDE 20 MG/ML
10 INJECTION INTRAMUSCULAR; INTRAVENOUS EVERY 6 HOURS PRN
Status: DISCONTINUED | OUTPATIENT
Start: 2024-03-20 | End: 2024-03-21

## 2024-03-20 RX ORDER — METOCLOPRAMIDE HYDROCHLORIDE 5 MG/5ML
10 SOLUTION ORAL EVERY 6 HOURS PRN
Status: DISCONTINUED | OUTPATIENT
Start: 2024-03-20 | End: 2024-03-21

## 2024-03-20 NOTE — PROGRESS NOTES
[facility]  Urology Progress Note    America Scott Patient Status:  Observation    1955 MRN [id number]   Location [facility] 3NW-A Attending Antonio Mcnamara,    Hosp Day # 0 PCP Agnes Sawyer MD     Subjective:  America Scott is a(n) 68 year old female.    S/P right NT placement 3/18/24 with IR     Current complaints: Some discomfort at NT site.  +nausea.  No fever or chills.  No dysuria. Voiding ok.      Objective:  General appearance: alert, appears stated age, and cooperative  Blood pressure 134/53, pulse 65, temperature 98.7 °F (37.1 °C), temperature source Oral, resp. rate 18, height 5' 4\" (1.626 m), weight 141 lb 6.4 oz (64.1 kg), SpO2 98%, not currently breastfeeding.  Lungs: non-labored respirations  Abdomen: soft, non-tender  Right NT in place draining yellow urine  Lab Results   Component Value Date    WBC 8.7 2024    HGB 11.5 2024    HCT 36.2 2024    .0 2024    CREATSERUM 0.80 2024    BUN 19 2024     2024    K 4.1 2024     2024    CO2 22.0 2024    GLU 80 2024    CA 9.2 2024    PGLU 137 2024       Hospital Encounter on 24   1. Urine Culture, Routine     Status: Abnormal (Preliminary result)    Collection Time: 24  9:37 PM    Specimen: Urine, clean catch   Result Value Ref Range    Urine Culture 10,000 - 50,000 CFU/ML Escherichia coli (A) N/A    Urine Culture 10,000 - 50,000 CFU/ML Proteus mirabilis (A) N/A        IR NEPHROSTOMY TUBE    Result Date: 3/18/2024  CONCLUSION: Successful placement of right 8.5 [place] nephrostomy tube  LOCATION:  [place]    Dictated by (CST): Llait Ramirez MD on 3/18/2024 at 5:03 PM     Finalized by (CST): Lalit Ramirez MD on 3/18/2024 at 5:05 PM         Assessment:    RIGHT FLANK PAIN, LARGE STAGHORN STONE IN RIGHT KIDNEY WITH PARTIAL OBSTRUCTION AND MILD TO MODERATE HYDRONEPHROSIS INVOLVING MIDDLE AND INFERIOR POLE CALICES OF RIGHT KIDNEY (6.2 x  2.7 cm), UTI  Status-post right NT placement 3/18/24 with IR  Afebrile, VSS  Urine culture 3/17/24: prelim 10-50K CFU/mL E. Coli, 10-50K CFU/mL Proteus mirabilis  No leukocytosis  Serum creat 1.36  Serum calcium level 9.9    Plan:    Check final urine culture results  Abx per attending  Follow labs, temp  Pain management  Supportive care  CPM with right NT to gravity drainage  Future staged right PCNL    Above discussed with patient, Dr. Martinez.    Desi Rojas PA-C  [facility]  Department of Urology  3/20/2024  6:26 AM

## 2024-03-20 NOTE — PLAN OF CARE
A&Ox4. VSS. RA. . Denies chest pain and SOB.   GI: Abdomen soft, nondistended. Passing gas.   Denies nausea. Pt states nausea is much better today. No meds given at this time.   : Voids in bathroom.   No pain meds given at this time.   Up with standby assist and a walker.   Drains: R Nephro tube is CDI, flushed with 10cc of saline. Draining light pink/yellow fluid into a leg bag.    Diet: Tolerating carb controlled diet.   IVF running per order with IV abx.   Pts accu checks have been in the 190s and 130s during my shift.   All appropriate safety measures in place. All questions and concerns addressed.

## 2024-03-20 NOTE — PROGRESS NOTES
[facility]   part of [facility] Hospitalist Progress Note     America Scott Patient Status:  Observation    1955 MRN [id number]   Location [facility] 3NW-A Attending Antonio Mcnamara DO   Hosp Day # 0 PCP Agnes Sawyer MD     CC:       Chief Complaint   Patient presents with    FU: Nausea/Vomiting/Diarrhea       Subjective:     Patient seen and examined.   No issues overngiht  Says she feels ok but rates pain as 5-6/10  Nausous and vomting this am  Says pain is same as yest  Vomiting while I was in room  Afebrile      Objective:    Review of Systems:   10 point ROS completed and was negative, except for pertinent positive and negatives stated in subjective.    Vital signs:  Temp:  [98.7 °F (37.1 °C)-99 °F (37.2 °C)] 98.7 °F (37.1 °C)  Pulse:  [65-90] 65  Resp:  [18] 18  BP: (134-155)/(51-53) 134/53  SpO2:  [98 %-99 %] 98 %    Physical Exam:    General: No acute distress.   HEENT:  EOMI, PERRLA, OP clear  Respiratory: Clear to auscultation bilaterally. No wheezes. No rhonchi.  Cardiovascular: S1, S2. Regular rate and rhythm. No murmurs.  Abdomen: Soft, nontender, nondistended.  Positive bowel sounds. No rebound or guarding.  Extremities: No edema.  Neuro:  Grossly non focal, no motor deficits.        Diagnostic Data:    Labs:  Recent Labs   Lab 24  0634 24  1051 24  0846   WBC 9.0 7.2  --  8.7   HGB 13.8 11.1*  --  11.5*   MCV 84.4 84.5  --  83.8   .0 230.0  --  233.0   INR  --   --  1.21*  --        Recent Labs   Lab 24  0634 24  0846   * 90 80   BUN 35* 29* 19   CREATSERUM 1.36* 1.08* 0.80   CA 9.9 8.9 9.2   ALB 3.8  --   --    * 142 140   K 5.2* 3.8 4.1    114* 114*   CO2 26.0 26.0 22.0   ALKPHO 98  --   --    AST 40*  --   --    ALT 18  --   --    BILT 1.1  --   --    TP 8.2  --   --        Estimated Creatinine Clearance: 59.7 mL/min (based on SCr of 0.8 mg/dL).    Recent Labs    Lab 03/18/24  1051   PTP 15.4*   INR 1.21*            COVID-19 Lab Results    COVID-19  No results found for: \"COVID19\"    Pro-Calcitonin  No results for input(s): \"PCT\" in the last 168 hours.    Cardiac  No results for input(s): \"TROP\", \"PBNP\" in the last 168 hours.    Creatinine Kinase  No results for input(s): \"CK\" in the last 168 hours.    Inflammatory Markers  No results for input(s): \"CRP\", \"EASTON\", \"LDH\", \"DDIMER\" in the last 168 hours.    Imaging: Imaging data reviewed in Epic.    Medications:    cefTRIAXone  1 g Intravenous Q24H    insulin aspart  1-5 Units Subcutaneous TID AC and HS       Assessment & Plan:    Patient is a 68 year old female with PMH sig for hypertension who presented to the ER for evaluation of right flank pain.     Right flank pain  Nephrolithiasis with hydronephrosis, right s/p R NT placement with IR 3/18/24  Probable UTI  - CT abdomen pelvis concerning for large staghorn calculi in the right kidney with mild to moderate hydronephrosis  - IVF  - IV pain medications  - UA grossly abnormal, await cultures, empiric ceftriaxone 3/17-  - Urology on consult >>  recommendations reviewed     CISCO  - Likely obstructive given hydronephrosis  - Creatinine 1.36 on presentation >> resolved 0.8   - IVF, follow BMP - resolved     HTN  -Takes lisinopril, hold given CISCO     DM2  - Accu-Cheks, ISS, hold metformin     FN:  - IVF: 0.9 NS  - Diet: N.p.o.     DVT Prophy: SCDs  Atrophy: Ambulate as tolerated  Lines: PIV     DISPO:  pending clinical course  Urology following       Margarita Quevedo Hospitalist  Pager [phone]  Answering Service number: [phone]

## 2024-03-20 NOTE — CM/SW NOTE
03/20/24 1500   CM/SW Referral Data   Referral Source Social Work (self-referral)   Reason for Referral Discharge planning   Informant Patient;EMR;Clinical Staff Member   Medical Hx   Does patient have an established PCP? Yes   Patient Info   Patient's Current Mental Status at Time of Assessment Alert;Oriented   Patient's Home Environment Condo/Apt no elevator   Number of Levels in Home 1   Number of Stair in Home 14   Patient lives with Alone   Patient Status Prior to Admission   Independent with ADLs and Mobility Yes   Discharge Needs   Anticipated D/C needs Home health care     Pt discussed in rounds with RN, pt requesting to dc to SNF. SW met with pt to complete assessment and discuss dc planning needs, she was alert, and oriented at time of discussion.    Pt reports she is typically independent with ADLs, reports she lives home alone, and her dtr is able to check on her when she is not working. SW reviewed criteria needed for pt to be able to dc to SNF, discussed pt can dc to SNF but would have to private pay. Pt voiced understanding.     AIDEN discussed HH with pt. SW sent HH referrals as it has been recommended for dc. Pt denies having HH arranged in the past, but is agreeable for dc. Pt voiced concerns to be able to go up the stairs at home, reports 14 steps to enter her home. Discussed concerns with RN, PT to work stairs with pt tomorrow.    Awaiting list of accepting HH to provide choice. HH orders entered.    Addendum 1659:  AIDEN met with pt to provide list of accepting HH. Pt reports she will review list, SW provided contact information.    SW/CM to remain available for dc planning, and/or additional need for support.    Jah Sanchez, [facility]  Discharge Planner  [phone]

## 2024-03-20 NOTE — PROGRESS NOTES
** I agree with charting of Mj Puente RN.       1213: Paged  to inform her of elevated blood pressure and nausea despite receiving Zofran. Orders placed by .

## 2024-03-20 NOTE — PHYSICAL THERAPY NOTE
PHYSICAL THERAPY EVALUATION - INPATIENT     Room Number: 302/302-A  Evaluation Date: 3/20/2024  Type of Evaluation: Initial  Physician Order: PT Eval and Treat    Presenting Problem: kidney stone  Co-Morbidities : hypertension  Reason for Therapy: Mobility Dysfunction and Discharge Planning    PHYSICAL THERAPY ASSESSMENT   Patient is currently functioning near baseline with bed mobility, transfers, and gait.  Prior to admission, patient's baseline is mod I with SPC however stating preference to utilizing RW with gait training performed this session .  Patient is requiring supervision as a result of the following impairments: decreased functional strength and pain additionally pt noting chronic deficits due to need for B hip replacement.  Physical Therapy will continue to follow for duration of hospitalization.    Patient will benefit from continued skilled PT Services at discharge to promote functional independence in home.  Anticipate patient will return home with home health PT.    PLAN  PT Treatment Plan: Bed mobility;Body mechanics;Coordination;Endurance;Energy conservation;Patient education;Family education;Gait training;Neuromuscular re-educate;Range of motion;Strengthening;Stoop training;Stair training;Transfer training;Balance training  Rehab Potential : Good  Frequency (Obs): 3x/week  Number of Visits to Meet Established Goals: 5      CURRENT GOALS    Goal #1 Patient is able to demonstrate supine - sit EOB @ level: independent     Goal #2 Patient is able to demonstrate transfers EOB to/from Chair/Wheelchair at assistance level: modified independent     Goal #3 Patient is able to ambulate 100 feet with assist device: walker - rolling at assistance level: supervision     Goal #4 Patient will navigate stairs with rail and SBA   Goal #5    Goal #6    Goal Comments: Goals established on 3/20/2024      PHYSICAL THERAPY MEDICAL/SOCIAL HISTORY  History related to current admission: Patient is a 68 year old  female admitted on 3/17/2024: Presented with R flank pain w/ nausea and committing dx nephrolithiasis with R hydronephrosis, probably UTI, S/P right NT placement 3/18/24 with IR      HOME SITUATION  Type of Home: Condo   Home Layout: One level  Stairs to Enter :  (yes pt did not state how many)             Lives With: Daughter (grandson)  Drives: No  Patient Owned Equipment: Cane  Patient Regularly Uses: Glasses    Prior Level of [place]: Pt reports ambulates short household distances with SPC, states 1 fall recently in the bathroom fell backwards, states has difficulty navigating stairs at baseline because she needs both hips replaced. Reports lives with dtr and grandson but there are times when she is alone at home.     SUBJECTIVE  \"I'm going to take care of this and then get my hips replaced'       OBJECTIVE  Precautions: Drain(s) (NT R)  Fall Risk: Standard fall risk    WEIGHT BEARING RESTRICTION  Weight Bearing Restriction: None                PAIN ASSESSMENT  Ratin          COGNITION  Overall Cognitive Status:  WFL - within functional limits    RANGE OF MOTION AND STRENGTH ASSESSMENT  Upper extremity ROM and strength are within functional limits     Lower extremity ROM is within functional limits     Lower extremity strength is within functional limits       BALANCE  Static Sitting: Good  Dynamic Sitting: Good  Static Standing: Fair -  Dynamic Standing: Fair -    ADDITIONAL TESTS                                    ACTIVITY TOLERANCE                         O2 WALK       NEUROLOGICAL FINDINGS                        AM-PAC '6-Clicks' INPATIENT SHORT FORM - BASIC MOBILITY  How much difficulty does the patient currently have...  Patient Difficulty: Turning over in bed (including adjusting bedclothes, sheets and blankets)?: None   Patient Difficulty: Sitting down on and standing up from a chair with arms (e.g., wheelchair, bedside commode, etc.): A Little   Patient Difficulty: Moving from lying on back to  sitting on the side of the bed?: A Little   How much help from another person does the patient currently need...   Help from Another: Moving to and from a bed to a chair (including a wheelchair)?: A Little   Help from Another: Need to walk in hospital room?: A Little   Help from Another: Climbing 3-5 steps with a railing?: A Little       AM-PAC Score:  Raw Score: 19   Approx Degree of Impairment: 41.77%   Standardized Score (AM-PAC Scale): 45.44   CMS Modifier (G-Code): CK    FUNCTIONAL ABILITY STATUS  Gait Assessment   Functional Mobility/Gait Assessment  Gait Assistance: Supervision  Distance (ft): 15,75  Assistive Device: Rolling walker  Pattern: Shuffle    Skilled Therapy Provided     Bed Mobility:  Rolling: mod I   Supine to sit: mod I       Transfer Mobility:  Sit to stand: mod I - increased time with pt utilizing compensatory strategies 2/2 B hip deficits    Stand to sit: mod I   Gait = SBA - gait trained with RW with vc for placement and rolling RW instead of picking it up for EC     Therapist's Comments: pt able to demonstrate good dynamic balance in the bathroom while performing standing LE sponge bath - encouraged single UE support on counter for safety however no obvious balance deficits demonstrated.     Exercise/Education Provided:  Body mechanics  Energy conservation  Functional activity tolerated  Gait training  Transfer training    Patient End of Session: Needs met;RN aware of session/findings;Call light within reach;All patient questions and concerns addressed;Up in chair;Discussed recommendations with /      Patient Evaluation Complexity Level:  History Low - no personal factors and/or co-morbidities   Examination of body systems Low - addressing 1-2 elements   Clinical Presentation Low - Stable   Clinical Decision Making Low - Stable       PT Session Time: 24 minutes  Gait Training: 10 minutes  Therapeutic Activity: 5 minutes  Neuromuscular Re-education: =  minutes  Therapeutic Exercise:  minutes

## 2024-03-21 ENCOUNTER — HOSPITAL ENCOUNTER (EMERGENCY)
Facility: HOSPITAL | Age: 69
Discharge: HOME | End: 2024-03-21
Attending: EMERGENCY MEDICINE
Payer: MEDICARE

## 2024-03-21 VITALS
RESPIRATION RATE: 18 BRPM | TEMPERATURE: 98 F | HEIGHT: 64 IN | OXYGEN SATURATION: 99 % | HEART RATE: 94 BPM | WEIGHT: 141.38 LBS | SYSTOLIC BLOOD PRESSURE: 126 MMHG | DIASTOLIC BLOOD PRESSURE: 57 MMHG | BODY MASS INDEX: 24.14 KG/M2

## 2024-03-21 VITALS
DIASTOLIC BLOOD PRESSURE: 64 MMHG | TEMPERATURE: 98 F | OXYGEN SATURATION: 100 % | HEART RATE: 82 BPM | RESPIRATION RATE: 14 BRPM | SYSTOLIC BLOOD PRESSURE: 145 MMHG

## 2024-03-21 DIAGNOSIS — T83.098A MALFUNCTION OF NEPHROSTOMY TUBE (HCC): Primary | ICD-10-CM

## 2024-03-21 LAB
D DIMER PPP FEU-MCNC: 2.24 UG/ML FEU (ref ?–0.66)
GLUCOSE BLD-MCNC: 101 MG/DL (ref 70–99)
GLUCOSE BLD-MCNC: 122 MG/DL (ref 70–99)
TROPONIN I SERPL HS-MCNC: 8 NG/L

## 2024-03-21 PROCEDURE — 99283 EMERGENCY DEPT VISIT LOW MDM: CPT

## 2024-03-21 PROCEDURE — 97116 GAIT TRAINING THERAPY: CPT

## 2024-03-21 PROCEDURE — 99282 EMERGENCY DEPT VISIT SF MDM: CPT

## 2024-03-21 PROCEDURE — 97110 THERAPEUTIC EXERCISES: CPT

## 2024-03-21 PROCEDURE — 85379 FIBRIN DEGRADATION QUANT: CPT | Performed by: HOSPITALIST

## 2024-03-21 PROCEDURE — 84484 ASSAY OF TROPONIN QUANT: CPT | Performed by: HOSPITALIST

## 2024-03-21 PROCEDURE — 82962 GLUCOSE BLOOD TEST: CPT

## 2024-03-21 RX ORDER — CEPHALEXIN 500 MG/1
500 CAPSULE ORAL 4 TIMES DAILY
Qty: 22 CAPSULE | Refills: 0 | Status: ON HOLD | OUTPATIENT
Start: 2024-03-21

## 2024-03-21 NOTE — PROGRESS NOTES
[facility]   part of [facility] Hospitalist Progress Note     America Scott Patient Status:  Observation    1955 MRN [id number]   Location [facility] 3NW-A Attending Antonio Mcnamara DO   Hosp Day # 0 PCP Agnes Sawyer MD     CC:       Chief Complaint   Patient presents with    FU: Nausea/Vomiting/Diarrhea       Subjective:     Patient seen and examined.   No issues overngiht  Says she feels ok but rates pain as 5-6/10  Says vomiting is better  Afebrile      Objective:    Review of Systems:   10 point ROS completed and was negative, except for pertinent positive and negatives stated in subjective.    Vital signs:  Temp:  [98.1 °F (36.7 °C)-98.9 °F (37.2 °C)] 98.3 °F (36.8 °C)  Pulse:  [62-95] 67  Resp:  [14-18] 16  BP: (119-183)/(53-67) 120/53  SpO2:  [93 %-100 %] 99 %    Physical Exam:    General: No acute distress.   HEENT:  EOMI, PERRLA, OP clear  Respiratory: Clear to auscultation bilaterally. No wheezes. No rhonchi.  Cardiovascular: S1, S2. Regular rate and rhythm. No murmurs.  Abdomen: Soft, nontender, nondistended.  Positive bowel sounds. No rebound or guarding.  Extremities: No edema.  Neuro:  Grossly non focal, no motor deficits.        Diagnostic Data:    Labs:  Recent Labs   Lab 24  0634 24  1051 24  0846   WBC 9.0 7.2  --  8.7   HGB 13.8 11.1*  --  11.5*   MCV 84.4 84.5  --  83.8   .0 230.0  --  233.0   INR  --   --  1.21*  --        Recent Labs   Lab 24  0634 24  0846   * 90 80   BUN 35* 29* 19   CREATSERUM 1.36* 1.08* 0.80   CA 9.9 8.9 9.2   ALB 3.8  --   --    * 142 140   K 5.2* 3.8 4.1    114* 114*   CO2 26.0 26.0 22.0   ALKPHO 98  --   --    AST 40*  --   --    ALT 18  --   --    BILT 1.1  --   --    TP 8.2  --   --        Estimated Creatinine Clearance: 59.7 mL/min (based on SCr of 0.8 mg/dL).    Recent Labs   Lab 24  1051   PTP 15.4*   INR 1.21*             COVID-19 Lab Results    COVID-19  No results found for: \"COVID19\"    Pro-Calcitonin  No results for input(s): \"PCT\" in the last 168 hours.    Cardiac  No results for input(s): \"TROP\", \"PBNP\" in the last 168 hours.    Creatinine Kinase  No results for input(s): \"CK\" in the last 168 hours.    Inflammatory Markers  Recent Labs   Lab 03/21/24  0653   DDIMER 2.24*       Imaging: Imaging data reviewed in Epic.    Medications:    cefTRIAXone  1 g Intravenous Q24H    insulin aspart  1-5 Units Subcutaneous TID AC and HS       Assessment & Plan:    Patient is a 68 year old female with PMH sig for hypertension who presented to the ER for evaluation of right flank pain.     Right flank pain  Nephrolithiasis with hydronephrosis, right s/p R NT placement with IR 3/18/24  Probable UTI  - CT abdomen pelvis concerning for large staghorn calculi in the right kidney with mild to moderate hydronephrosis  - IVF  - IV pain medications  - UA grossly abnormal >> ur cx grwing 10-50K ecoli and proteus resist to macrobid and bactrim >> cont empiric rocephin >> will switch to po keflex on discharge  - Urology on consult >>  recommendations reviewed     CISCO  - Likely obstructive given hydronephrosis  - Creatinine 1.36 on presentation >> resolved 0.8   - IVF, follow BMP - resolved    Midsternal tenderness  -muskuloskeletal in origin most likely  -pt has been vomiting a lot      HTN  -Takes lisinopril, hold given CISCO     DM2  - Accu-Cheks, ISS, hold metformin     FN:  - IVF: 0.9 NS  - Diet: N.p.o.     DVT Prophy: SCDs  Atrophy: Ambulate as tolerated  Lines: PIV     DISPO:  DC planning in process  Dw urology PA Desi >> pt likely can go home today  Possible dc today?      Margarita Welch MD  [facility] Hospitalist  Pager [phone]  Answering Service number: [phone]

## 2024-03-21 NOTE — PROGRESS NOTES
Patient has IV fluids infusing, on room air, voiding well, right nephrostomy tube in place with pink tinged urine-flushed at 1000, IV Rocephin scheduled, ambulates with standby and a walker, Zofran and Reglan given for nausea/vomiting, has not eaten anything from diabetic diet-only taken in some liquids. Patient updated on plan of care.

## 2024-03-21 NOTE — PROGRESS NOTES
[facility]  Urology Progress Note    America Scott Patient Status:  Observation    1955 MRN [id number]   Location [facility] 3NW-A Attending Margarita Welch MD   Hosp Day # 0 PCP Agnes Sawyer MD     Subjective:  America Scott is a(n) 68 year old female.    S/P right NT placement 3/18/24 with IR     Current complaints: No fever. Feeling better - no nausea/vomiting so far today.  Pain controlled.      Patient reports mid chest pain with taking a deep breaths since before admission.  Vital signs are stable, on RA (SpO2 99 %)    Objective:  General appearance: alert, appears stated age, and cooperative  Blood pressure 119/53, pulse 62, temperature 98.1 °F (36.7 °C), temperature source Oral, resp. rate 14, height 5' 4\" (1.626 m), weight 141 lb 6.4 oz (64.1 kg), SpO2 100%, not currently breastfeeding.  Lungs: non-labored respirations  Abdomen: soft, non-tender  Right NT draining yellow urine (UOP - 525 mL/24 hours)  Lab Results   Component Value Date    PGLU 76 2024       Hospital Encounter on 24   1. Urine Culture, Routine     Status: Abnormal    Collection Time: 24  9:37 PM    Specimen: Urine, clean catch   Result Value Ref Range    Urine Culture 10,000 - 50,000 CFU/ML Escherichia coli (A) N/A    Urine Culture 10,000 - 50,000 CFU/ML Proteus mirabilis (A) N/A       Susceptibility    Escherichia coli -  (no method available)     Ampicillin 4 Sensitive      Cefazolin <=4 Sensitive      Ciprofloxacin <=0.25 Sensitive      Gentamicin <=1 Sensitive      Meropenem <=0.25 Sensitive      Levofloxacin <=0.12 Sensitive      Nitrofurantoin <=16 Sensitive      Piperacillin + Tazobactam <=4 Sensitive      Trimethoprim/Sulfa <=20 Sensitive     Proteus mirabilis -  (no method available)     Ampicillin <=2 Sensitive      Cefazolin <=4 Sensitive      Ciprofloxacin <=0.25 Sensitive      Gentamicin <=1 Sensitive      Meropenem <=0.25 Sensitive      Levofloxacin <=0.12 Sensitive      Nitrofurantoin  128 Resistant      Piperacillin + Tazobactam <=4 Sensitive      Trimethoprim/Sulfa >=320 Resistant         Assessment:    RIGHT FLANK PAIN, LARGE STAGHORN STONE IN RIGHT KIDNEY WITH PARTIAL OBSTRUCTION AND MILD TO MODERATE HYDRONEPHROSIS INVOLVING MIDDLE AND INFERIOR POLE CALICES OF RIGHT KIDNEY (6.2 x 2.7 cm), UTI  Status-post right NT placement 3/18/24 with IR  Afebrile, VSS  Urine culture 3/17/24: 10-50K CFU/mL E. Coli, 10-50K CFU/mL Proteus mirabilis  No leukocytosis  Serum creat 1.36  Serum calcium level 9.9    Plan:    -Nurse to call hospitalist now regarding mid chest pain with taking a deep breaths since before admission  -CPM with abx - recommend abx for a total of 7 days.    -CPM with right NT  -Future staged right PCNL.  Patient will need to resume abx 2 days prior to surgery x 5 days.      Above discussed with patient, nurse, hospitalist, Dr. Martinez.      Desi Rojas PA-C  [facility]  Department of Urology  3/21/2024  6:16 AM

## 2024-03-21 NOTE — PHYSICAL THERAPY NOTE
PHYSICAL THERAPY TREATMENT NOTE - INPATIENT    Room Number: 302/302-A     Session: 1     Number of Visits to Meet Established Goals: 5  History related to current admission: Patient is a 68 year old female admitted on 3/17/2024: Presented with R flank pain w/ nausea and committing dx nephrolithiasis with R hydronephrosis, probably UTI, S/P right NT placement 3/18/24 with IR     Prior Level of Gra[place]: Pt reports ambulates short household distances with SPC, states 1 fall recently in the bathroom fell backwards, states has difficulty navigating stairs at baseline because she needs both hips replaced. Reports lives with dtr and grandson but there are times when she is alone at home.     Presenting Problem: kidney stone  Co-Morbidities : hypertension    ASSESSMENT   Patient demonstrates good  progress this session, goals  all met    Patient continues to function near baseline with bed mobility, transfers, gait, and stair negotiation.  Patient demonstrated ability to ambulate with RW and navigate stairs.     Patient has met all skilled IPPT goals at this time. Patient will be discharged from Physical Therapy services.  Please re-order if a new functional limitation presents during this admission.    PLAN  PT Treatment Plan: Bed mobility;Body mechanics;Coordination;Endurance;Energy conservation;Patient education;Family education;Gait training;Neuromuscular re-educate;Range of motion;Strengthening;Stoop training;Stair training;Transfer training;Balance training  Rehab Potential : Good  Frequency (Obs): 3x/week    CURRENT GOALS     Goal #1 Patient is able to demonstrate supine - sit EOB @ level: independent      Goal #2 Patient is able to demonstrate transfers EOB to/from Chair/Wheelchair at assistance level: modified independent      Goal #3 Patient is able to ambulate 100 feet with assist device: walker - rolling at assistance level: supervision      Goal #4 Patient will navigate stairs with rail and SBA   Goal  #5     Goal #6     Goal Comments: Goals established on 3/20/2024  3/21/2024 all goals achieved     SUBJECTIVE  \"We should practice more then\"     OBJECTIVE  Precautions: Drain(s) (NT R)    WEIGHT BEARING RESTRICTION  Weight Bearing Restriction: None                PAIN ASSESSMENT   Rating: Unable to rate  Location: abdomen upper when she takes a deep breath  Management Techniques: Repositioning;Breathing techniques;Body mechanics    BALANCE                                                                                                                       Static Sitting: Good  Dynamic Sitting: Good           Static Standing: Fair -  Dynamic Standing: Fair -    ACTIVITY TOLERANCE                         O2 WALK         AM-PAC '6-Clicks' INPATIENT SHORT FORM - BASIC MOBILITY  How much difficulty does the patient currently have...  Patient Difficulty: Turning over in bed (including adjusting bedclothes, sheets and blankets)?: None   Patient Difficulty: Sitting down on and standing up from a chair with arms (e.g., wheelchair, bedside commode, etc.): A Little   Patient Difficulty: Moving from lying on back to sitting on the side of the bed?: A Little   How much help from another person does the patient currently need...   Help from Another: Moving to and from a bed to a chair (including a wheelchair)?: A Little   Help from Another: Need to walk in hospital room?: A Little   Help from Another: Climbing 3-5 steps with a railing?: A Little       AM-PAC Score:  Raw Score: 19   Approx Degree of Impairment: 41.77%   Standardized Score (AM-PAC Scale): 45.44   CMS Modifier (G-Code): CK    FUNCTIONAL ABILITY STATUS  Gait Assessment   Functional Mobility/Gait Assessment  Gait Assistance: Supervision  Distance (ft): 15,100  Assistive Device: Rolling walker  Pattern: Shuffle  Stairs: Stairs (4x4)  How Many Stairs: 16  Device: 1 Rail  Assist: Supervision  Pattern: Ascend and Descend  Ascend and Descend : Side step    Skilled Therapy  Provided       Transfer Mobility:  Sit<>Stand: mod I    Stand<>Sit: mod I    Gait: SBA - able to tolerate further gait training with RW, vc for placement while turning with education on EC and pacing techniques. Educated on recommendations for stair training utilizing one rail and side stepping to ascend, practiced BL    Therapist's Comments: pt denied any further mobility concerns regarding dc      THERAPEUTIC EXERCISES  Lower Extremity Alternating marching  Ankle pumps  LAQ  Standing marching  Standing knee flexion/hamstring curl   Upper Extremity Elbow flex/ext and Shoulder flex/ext     Position Sitting     Repetitions   12   Sets   1     Patient End of Session: Up in chair;Needs met;Call light within reach;RN aware of session/findings;All patient questions and concerns addressed    PT Session Time: 29 minutes  Gait Trainin minutes  Therapeutic Activity:  minutes  Therapeutic Exercise: 8 minutes   Neuromuscular Re-education:  minutes

## 2024-03-21 NOTE — PROGRESS NOTES
A&Ox4. VSS. RA.   GI: Abdomen soft, nondistended.   Denies nausea.  : Voids.  Pain controlled with PRN pain medications  Up with standby assist and walker. Cane from home in closet  Drains: Nephrostomy tube to R side  Diet: carb control  IVF running per order.  All appropriate safety measures in place. Patient updated on plan of care. All questions and concerns addressed.

## 2024-03-21 NOTE — CM/SW NOTE
03/21/24 1200   Discharge Needs   Anticipated D/C needs Home health care   Choice of Post-Acute Provider   Informed patient of right to choose their preferred provider Yes   List of appropriate post-acute services provided to patient/family with quality data Yes   Patient/family choice [facility]     SW met with pt to obtain HH choice, reports she has chosen [facility]. SW reserved in [facility], notified of potential dc for today. SW will send AVS to HH via [facility] once available.    Addendum 1500:  SW met with pt to discuss transport home. Discussed [facility] transport, or uber home. Pt reports she has keys to enter her home. Pt reports she will call her dtr to see if she can transport her home. RN updated.    Addendum 1600:  Pt reports her dtr will pick her up. AVS uploaded to HH referral.    SW/CM to remain available for dc planning, and/or additional need for support.    Jah Sanchez, DAVIDA  Discharge Planner  [phone]

## 2024-03-22 NOTE — CM/SW NOTE
Received a call from Dr. Zambrano to check if pt can qualify for a [facility] placement. Edcm checked the pt's chart/notes including the PT eval notes and did not see anything that will qualify for a [facility] placement for the pt. HH is already arranged for the pt prior to dc earlier today. Dr. Zambrano made aware of the above.

## 2024-03-22 NOTE — ED PROVIDER NOTES
Patient Seen in: [facility] Emergency Department      History     Chief Complaint   Patient presents with    Cath Tube Problem     Stated Complaint: nephrostomy tube leaking    Subjective:   HPI    68-year-old female who presents for evaluation of a leaking nephrostomy tube.  Patient was discharged earlier today after admission when she was found to have a large right staghorn calculus.  Percutaneous nephrostomy tube was placed and she is scheduled for definitive procedure next week.  She was discharged this afternoon and upon getting home noticed urine leaking from someone in the bag or the tube.  She could not tell where so she returned to the ED.  Triage it was noted that the cap was not on the bottom of the leg bag so that was placed and there is no more leaking.  However states she would have preferred to be discharged to a rehab facility rather than home.  She does live with her daughter but she is gone to work a lot of the day.  She does have home health nursing arranged.  Some pain at the site but not too bad.    Objective:   Past Medical History:   Diagnosis Date    Essential hypertension               History reviewed. No pertinent surgical history.             Social History     Socioeconomic History    Marital status: Single   Tobacco Use    Smoking status: Every Day     Packs/day: 0.75     Years: 35.00     Additional pack years: 0.00     Total pack years: 26.25     Types: Cigarettes    Smokeless tobacco: Never   Vaping Use    Vaping Use: Never used   Substance and Sexual Activity    Alcohol use: Not Currently    Drug use: Not Currently     Social Determinants of Health     Food Insecurity: Food Insecurity Present (3/18/2024)    Food Insecurity     Food Insecurity: Sometimes true   Transportation Needs: No Transportation Needs (3/18/2024)    Transportation Needs     Lack of Transportation: No   Housing Stability: Low Risk  (3/18/2024)    Housing Stability     Housing Instability: No               Review of Systems    Positive for stated complaint: nephrostomy tube leaking  Other systems are as noted in HPI.  Constitutional and vital signs reviewed.      All other systems reviewed and negative except as noted above.    Physical Exam     ED Triage Vitals [03/21/24 1920]   /72   Pulse 86   Resp 18   Temp 97.6 °F (36.4 °C)   Temp src Temporal   SpO2 97 %   O2 Device None (Room air)       Current:/64   Pulse 81   Temp 97.6 °F (36.4 °C) (Temporal)   Resp 14   SpO2 100%         Physical Exam  Vitals and nursing note reviewed.   Constitutional:       Appearance: She is well-developed.   HENT:      Head: Normocephalic and atraumatic.   Eyes:      Conjunctiva/sclera: Conjunctivae normal.      Pupils: Pupils are equal, round, and reactive to light.   Cardiovascular:      Rate and Rhythm: Normal rate and regular rhythm.      Heart sounds: Normal heart sounds.   Pulmonary:      Effort: Pulmonary effort is normal.      Breath sounds: Normal breath sounds.   Abdominal:      General: Bowel sounds are normal.      Palpations: Abdomen is soft.      Comments: Dressing on the percutaneous nephrostomy site in the right flank was not taken off.  Tubing is in good condition there is no evidence of leakage anywhere.   Musculoskeletal:         General: Normal range of motion.   Skin:     General: Skin is warm and dry.   Neurological:      Mental Status: She is alert and oriented to person, place, and time.               ED Course   Labs Reviewed - No data to display                   MDM      Pleasant enough 68-year-old female presenting with concern for leaking from her nephrostomy tube.  Noted in triage apparently the leg bag did not have a cap on it so that was placed and there is no further leaking.  Tubing is in good position with no evidence of fracture.  She is asking about the possibility of transfer to a rehab facility as she is concerned about going home so soon after the procedure.  They looked into  this upstairs and apparently she did not qualify on insurance.  Will contact the  here in the ED to verify that this is the case and that there are no other options.    Update at 10:45 PM.  Discussed with  who confirms with patient's insurance and needs admission to any sort of SNF or rehab facility is not an option.  She does have home health nursing set up.  No indication for medical admission today.      Past Medical History-hypertension    Differential diagnosis before testing included broken nephrostomy tube, dislodged cap    Co-morbidities that add to the complexity of management include: None     Testing ordered during this visit included none        External chart review showed reviewed notes from recent inpatient admission            Disposition:          Discharge  I have discussed with the patient the results of test, differential diagnosis, treatment plan, warning signs and symptoms which should prompt immediate return.  They expressed understanding of these instructions and agrees to the following plan provided.  They were given written discharge instructions and agrees to return for any concerns and voiced understanding and all questions were answered.                             Medical Decision Making      Disposition and Plan     Clinical Impression:  1. Malfunction of nephrostomy tube (HCC)         Disposition:  Discharge  3/21/2024 10:47 pm    Follow-up:  Agnes Sawyer MD  [address]  [place] [address]  [phone]    Follow up            Medications Prescribed:  Current Discharge Medication List

## 2024-03-22 NOTE — ED INITIAL ASSESSMENT (HPI)
Patient here for nephrostomy tube leaking, tube was not clamped, this RN clamped tube and not more leaking noted. Patient then reports she feels like she was discharged too soon, patient discharged 2 hours ago.

## 2024-03-23 PROCEDURE — 96374 THER/PROPH/DIAG INJ IV PUSH: CPT

## 2024-03-23 PROCEDURE — 99285 EMERGENCY DEPT VISIT HI MDM: CPT

## 2024-03-23 PROCEDURE — 99284 EMERGENCY DEPT VISIT MOD MDM: CPT

## 2024-03-24 ENCOUNTER — HOSPITAL ENCOUNTER (EMERGENCY)
Facility: HOSPITAL | Age: 69
Discharge: HOME | End: 2024-03-24
Attending: EMERGENCY MEDICINE
Payer: MEDICARE

## 2024-03-24 ENCOUNTER — APPOINTMENT (OUTPATIENT)
Dept: ULTRASOUND IMAGING | Facility: HOSPITAL | Age: 69
End: 2024-03-24
Attending: EMERGENCY MEDICINE
Payer: MEDICARE

## 2024-03-24 VITALS
HEART RATE: 69 BPM | SYSTOLIC BLOOD PRESSURE: 153 MMHG | DIASTOLIC BLOOD PRESSURE: 75 MMHG | RESPIRATION RATE: 20 BRPM | HEIGHT: 63 IN | TEMPERATURE: 97 F | BODY MASS INDEX: 25 KG/M2 | OXYGEN SATURATION: 100 %

## 2024-03-24 DIAGNOSIS — D64.9 ANEMIA, UNSPECIFIED TYPE: ICD-10-CM

## 2024-03-24 DIAGNOSIS — R60.0 PERIPHERAL EDEMA: Primary | ICD-10-CM

## 2024-03-24 LAB
ALBUMIN SERPL-MCNC: 3.2 G/DL (ref 3.4–5)
ALBUMIN/GLOB SERPL: 1 {RATIO} (ref 1–2)
ALP LIVER SERPL-CCNC: 113 U/L
ALT SERPL-CCNC: 16 U/L
ANION GAP SERPL CALC-SCNC: 4 MMOL/L (ref 0–18)
AST SERPL-CCNC: 24 U/L (ref 15–37)
BASOPHILS # BLD AUTO: 0.02 X10(3) UL (ref 0–0.2)
BASOPHILS NFR BLD AUTO: 0.2 %
BILIRUB SERPL-MCNC: 0.2 MG/DL (ref 0.1–2)
BUN BLD-MCNC: 14 MG/DL (ref 9–23)
CALCIUM BLD-MCNC: 9.2 MG/DL (ref 8.5–10.1)
CHLORIDE SERPL-SCNC: 113 MMOL/L (ref 98–112)
CO2 SERPL-SCNC: 28 MMOL/L (ref 21–32)
CREAT BLD-MCNC: 0.94 MG/DL
EGFRCR SERPLBLD CKD-EPI 2021: 67 ML/MIN/1.73M2 (ref 60–?)
EOSINOPHIL # BLD AUTO: 0.1 X10(3) UL (ref 0–0.7)
EOSINOPHIL NFR BLD AUTO: 1.2 %
ERYTHROCYTE [DISTWIDTH] IN BLOOD BY AUTOMATED COUNT: 14.5 %
GLOBULIN PLAS-MCNC: 3.1 G/DL (ref 2.8–4.4)
GLUCOSE BLD-MCNC: 82 MG/DL (ref 70–99)
GLUCOSE BLD-MCNC: 86 MG/DL (ref 70–99)
HCT VFR BLD AUTO: 31 %
HGB BLD-MCNC: 9.7 G/DL
IMM GRANULOCYTES # BLD AUTO: 0.02 X10(3) UL (ref 0–1)
IMM GRANULOCYTES NFR BLD: 0.2 %
LYMPHOCYTES # BLD AUTO: 2.74 X10(3) UL (ref 1–4)
LYMPHOCYTES NFR BLD AUTO: 33.2 %
MCH RBC QN AUTO: 26.7 PG (ref 26–34)
MCHC RBC AUTO-ENTMCNC: 31.3 G/DL (ref 31–37)
MCV RBC AUTO: 85.4 FL
MONOCYTES # BLD AUTO: 0.74 X10(3) UL (ref 0.1–1)
MONOCYTES NFR BLD AUTO: 9 %
NEUTROPHILS # BLD AUTO: 4.63 X10 (3) UL (ref 1.5–7.7)
NEUTROPHILS # BLD AUTO: 4.63 X10(3) UL (ref 1.5–7.7)
NEUTROPHILS NFR BLD AUTO: 56.2 %
OSMOLALITY SERPL CALC.SUM OF ELEC: 300 MOSM/KG (ref 275–295)
PLATELET # BLD AUTO: 211 10(3)UL (ref 150–450)
POTASSIUM SERPL-SCNC: 3.9 MMOL/L (ref 3.5–5.1)
PROT SERPL-MCNC: 6.3 G/DL (ref 6.4–8.2)
RBC # BLD AUTO: 3.63 X10(6)UL
SODIUM SERPL-SCNC: 145 MMOL/L (ref 136–145)
WBC # BLD AUTO: 8.3 X10(3) UL (ref 4–11)

## 2024-03-24 PROCEDURE — 80053 COMPREHEN METABOLIC PANEL: CPT | Performed by: EMERGENCY MEDICINE

## 2024-03-24 PROCEDURE — 85025 COMPLETE CBC W/AUTO DIFF WBC: CPT | Performed by: EMERGENCY MEDICINE

## 2024-03-24 PROCEDURE — 93970 EXTREMITY STUDY: CPT | Performed by: EMERGENCY MEDICINE

## 2024-03-24 PROCEDURE — 82962 GLUCOSE BLOOD TEST: CPT

## 2024-03-24 RX ORDER — FUROSEMIDE 10 MG/ML
40 INJECTION INTRAMUSCULAR; INTRAVENOUS ONCE
Status: COMPLETED | OUTPATIENT
Start: 2024-03-24 | End: 2024-03-24

## 2024-03-24 NOTE — ED INITIAL ASSESSMENT (HPI)
Pt presents to ed with complaints of bilateral lower extremity swelling, states was recently admitted to hospital with kidney stone

## 2024-03-24 NOTE — ED PROVIDER NOTES
Patient Seen in: [facility] Emergency Department      History     Chief Complaint   Patient presents with    Swelling Edema     Stated Complaint: BLE swelling    Subjective:   68-year-old female presents emergency room for peripheral edema.  Patient has noticed increased swelling in her lower extremities since being discharged from the hospital for recent kidney stone.  Patient contacted her primary doctor advised her to come to the emergency room.  On exam legs are symmetric +1 pitting edema +2 dorsal pedal pulses.  Patient states her legs are normally not swollen like this.  Patient had recently been in the hospital and likely received lots of fluids.  Explained to the patient that this appears to be peripheral edema dependent edema.  However we will get an ultrasound to rule out acute DVT.    The history is provided by the patient.           Objective:   Past Medical History:   Diagnosis Date    Essential hypertension               History reviewed. No pertinent surgical history.             Social History     Socioeconomic History    Marital status: Single   Tobacco Use    Smoking status: Every Day     Packs/day: 0.75     Years: 35.00     Additional pack years: 0.00     Total pack years: 26.25     Types: Cigarettes    Smokeless tobacco: Never   Vaping Use    Vaping Use: Never used   Substance and Sexual Activity    Alcohol use: Not Currently    Drug use: Not Currently     Social Determinants of Health     Food Insecurity: Food Insecurity Present (3/18/2024)    Food Insecurity     Food Insecurity: Sometimes true   Transportation Needs: No Transportation Needs (3/18/2024)    Transportation Needs     Lack of Transportation: No   Housing Stability: Low Risk  (3/18/2024)    Housing Stability     Housing Instability: No              Review of Systems   Respiratory:  Negative for shortness of breath.    Musculoskeletal:  Negative for arthralgias.        Edema       Positive for stated complaint: BLE  swelling  Other systems are as noted in HPI.  Constitutional and vital signs reviewed.      All other systems reviewed and negative except as noted above.    Physical Exam     ED Triage Vitals [03/23/24 2341]   /75   Pulse 83   Resp 18   Temp 97.3 °F (36.3 °C)   Temp src Temporal   SpO2 96 %   O2 Device None (Room air)       Current:/75   Pulse 83   Temp 97.3 °F (36.3 °C) (Temporal)   Resp 18   Ht 160 cm (5' 3\")   SpO2 96%   BMI 25.05 kg/m²         Physical Exam  Vitals and nursing note reviewed.   Constitutional:       General: She is not in acute distress.     Appearance: Normal appearance. She is normal weight. She is not toxic-appearing.   HENT:      Head: Normocephalic and atraumatic.   Eyes:      Extraocular Movements: Extraocular movements intact.      Pupils: Pupils are equal, round, and reactive to light.   Cardiovascular:      Rate and Rhythm: Normal rate and regular rhythm.      Pulses: Normal pulses.   Pulmonary:      Effort: Pulmonary effort is normal.      Breath sounds: Normal breath sounds.   Abdominal:      General: Bowel sounds are normal. There is no distension.      Palpations: Abdomen is soft.      Tenderness: There is no abdominal tenderness.      Comments: Nephrostomy tube in place   Musculoskeletal:         General: Swelling present.      Right lower leg: Edema present.      Left lower leg: Edema present.      Comments: +1 bilateral pitting edema mild last 2 dorsal pedal pulses bilaterally   Skin:     General: Skin is warm.      Capillary Refill: Capillary refill takes less than 2 seconds.      Findings: No rash.   Neurological:      General: No focal deficit present.      Mental Status: She is alert and oriented to person, place, and time.   Psychiatric:         Mood and Affect: Mood normal.         Behavior: Behavior normal.               ED Course     Labs Reviewed   COMP METABOLIC PANEL (14) - Abnormal; Notable for the following components:       Result Value    Chloride  113 (*)     Calculated Osmolality 300 (*)     Total Protein 6.3 (*)     Albumin 3.2 (*)     All other components within normal limits   CBC W/ DIFFERENTIAL - Abnormal; Notable for the following components:    RBC 3.63 (*)     HGB 9.7 (*)     HCT 31.0 (*)     All other components within normal limits   POCT GLUCOSE - Normal   CBC WITH DIFFERENTIAL WITH PLATELET    Narrative:     The following orders were created for panel order CBC With Differential With Platelet.  Procedure                               Abnormality         Status                     ---------                               -----------         ------                     CBC W/ DIFFERENTIAL[[id number]]          Abnormal            Final result                 Please view results for these tests on the individual orders.             Ultrasound shows technically limited study due to patient's inability to properly position lower extremities for the exam.  No gross evidence of DVT.  Moderate soft tissue edema in the bilateral calves.         MDM      Social -negative tobacco, negative etoh, negative drugs  Family History-noncontributory  Past Medical History-hypertension    Differential diagnosis before testing included pulm edema, DVT, dependent edema, nephrotic syndrome    Co-morbidities that add to the complexity of management include: Recent hospitalization for treatment for kidney stone likely lots of fluids    Testing ordered during this visit included ultrasound of bilateral lower extremities, baseline labs    Radiographic images  I personally reviewed the radiographs and my individual interpretation shows no DVT  I also reviewed the official reports that showed technically limited due to patient's inability to appropriately position lower extremities with exam.  No gross evidence for DVT.  Moderate soft tissue edema in the calves bilaterally.    External chart review showed review of care everywhere in epic system shows no related comorbidities to  current presentation other than recent hospitalization for kidney stones.    History obtained by an independent source included from patient    Discussion of management with patient, case management    Social determinants of health that affect care include recent hospitalization patient has a nephrostomy tube in place      Medications Provided: Lasix    Course of Events during Emergency Room Visit include 68-year-old female presents emergency room for peripheral edema.  Will get ultrasound to rule out DVT.  Her ultrasound does not show any signs of DVT renal function is appropriate.  Patient is making urine.  Will plan on a dose of Lasix to help with excess water retention and will place MILKA hose.  Patient to follow-up with primary care physician.    Patient requested for assistance with a ride home we will speak with case management to help arrange for this.          Disposition:      Discharge  I have discussed with the patient the results of test, differential diagnosis, treatment plan, warning signs and symptoms which should prompt immediate return.  They expressed understanding of these instructions and agrees to the following plan provided.  They were given written discharge instructions and agrees to return for any concerns and voiced understanding and all questions were answered.                                      Medical Decision Making      Disposition and Plan     Clinical Impression:  1. Peripheral edema    2. Anemia, unspecified type         Disposition:  Discharge  3/24/2024  3:48 am    Follow-up:  Agnes Sawyer MD  [address] [address]  [phone]    Schedule an appointment as soon as possible for a visit            Medications Prescribed:  Current Discharge Medication List

## 2024-03-27 PROBLEM — L20.84 INTRINSIC ECZEMA: Status: ACTIVE | Noted: 2017-02-20

## 2024-03-27 RX ORDER — SODIUM CHLORIDE, SODIUM LACTATE, POTASSIUM CHLORIDE, CALCIUM CHLORIDE 600; 310; 30; 20 MG/100ML; MG/100ML; MG/100ML; MG/100ML
INJECTION, SOLUTION INTRAVENOUS CONTINUOUS
Status: CANCELLED | OUTPATIENT
Start: 2024-03-27

## 2024-03-27 RX ORDER — ACETAMINOPHEN 500 MG
1000 TABLET ORAL ONCE
Status: CANCELLED | OUTPATIENT
Start: 2024-03-27 | End: 2024-03-27

## 2024-03-27 RX ORDER — ACETAMINOPHEN 500 MG
500 TABLET ORAL EVERY 6 HOURS PRN
COMMUNITY

## 2024-03-31 NOTE — DISCHARGE SUMMARY
[facility]  HOSPITALIST  DISCHARGE SUMMARY     America Scott Patient Status:  Observation    1955 MRN [id number]   Location [facility] 3NW-A Attending No att. providers found   Hosp Day # 0 PCP Agnes Sawyer MD     Date of Admission: 3/17/2024  Date of Discharge: 3/21/2024  Discharge Disposition: Home or Self Care    Discharge Diagnosis:   Right flank pain  Nephrolithiasis with hydronephrosis, right s/p R NT placement with IR 3/18/24  Probable UTI  CISCO  Midsternal tenderness  HTN  DM2    History of Present Illness:   Patient is a 68 year old female with PMH sig for hypertension who presented to the ER for evaluation of right flank pain.  Patient states that she has been having pain in her belly for the past few days, the pain would not tolerate, worse 10 intensity.  She she also experienced nausea and vomiting because of the pain.  She denies any fevers chills urinary symptoms.  No previous episodes of pain similar to this.  In the ER, patient was hemodynamically stable.  Labs concerning for CISCO, CT abdomen pelvis was obtained which was abnormal.  Patient was admitted for further workup and management.     Brief Synopsis:     Right flank pain  Nephrolithiasis with hydronephrosis, right s/p R NT placement with IR 3/18/24  Probable UTI  - CT abdomen pelvis concerning for large staghorn calculi in the right kidney with mild to moderate hydronephrosis  - IVF  - IV pain medications  - UA grossly abnormal >> ur cx grwing 10-50K ecoli and proteus resist to macrobid and bactrim >> cont empiric rocephin >> will switch to po keflex on discharge  - Urology on consult >>  recommendations reviewed     CISCO  - Likely obstructive given hydronephrosis  - Creatinine 1.36 on presentation >> resolved 0.8   - IVF, follow BMP - resolved     Midsternal tenderness  -muskuloskeletal in origin most likely  -pt has been vomiting a lot      HTN  -Takes lisinopril, hold given CISCO     DM2  - Accu-Cheks, ISS, hold metformin     DC  INSTRUCTIONS  Dw urology PA Desi >> pt  can go home today  Fu with urology as instructed       Lace+ Score: 33  59-90 High Risk  29-58 Medium Risk  0-28   Low Risk       TCM Follow-Up Recommendation:  LACE 29-58: Moderate Risk of readmission after discharge from the hospital.    Procedures during hospitalization:   s/p R NT placement with IR 3/18/24    Incidental or significant findings and recommendations (brief descriptions):  N/a    Lab/Test results pending at Discharge:   N/a    Consultants:  urology    Discharge Medication List:     Discharge Medications        CHANGE how you take these medications        Instructions Prescription details   cephalexin 500 MG Caps  Commonly known as: Keflex  What changed:   when to take this  additional instructions      Take 1 capsule (500 mg total) by mouth 4 (four) times daily. Take 4 tabs by mouth daily for 3 days and stop; Then take 2 tabs daily starting 2 days prior to stone surgery and continue for 5 days   Quantity: 22 capsule  Refills: 0            CONTINUE taking these medications        Instructions Prescription details   atorvastatin 10 MG Tabs  Commonly known as: Lipitor      Take 1 tablet (10 mg total) by mouth daily.   Quantity: 30 tablet  Refills: 12     lisinopril 5 MG Tabs  Commonly known as: Prinivil; Zestril      Take 1 tablet (5 mg total) by mouth daily.   Quantity: 90 tablet  Refills: 2     metFORMIN  MG Tb24  Commonly known as: Glucophage XR      Take 1 tablet (500 mg total) by mouth 2 (two) times daily.   Refills: 0     ondansetron 4 MG Tbdp  Commonly known as: Zofran-ODT      DISSOLVE 1 TABLET IN MOUTH EVERY 6 HOURS   Refills: 0               Where to Get Your Medications        These medications were sent to [id number] - [place], [place] - [address] [phone], [phone]  [address] [address]      Phone: [phone]   cephalexin 500 MG Caps         [place] reviewed[place]   N/a    Follow-up appointment:   Jesus Martinez MD  [address]  [address]  [facility] [address]  [phone]    Follow up  for stone procedure (PCNL) - surgery scheduler will contact you to arrange procedure    Agnes aSwyer MD  [address] [facility] [address]  [facility] [address]  [phone]    Follow up  3/26 @ 1140 am    Appointments for Next 30 Days 3/31/2024 - 4/30/2024      None            Vital signs:     Temp:  [98.1 °F (36.7 °C)-98.9 °F (37.2 °C)] 98.3 °F (36.8 °C)  Pulse:  [62-95] 67  Resp:  [14-18] 16  BP: (119-183)/(53-67) 120/53  SpO2:  [93 %-100 %] 99 %     Physical Exam:    General: No acute distress.   HEENT:  EOMI, PERRLA, OP clear  Respiratory: Clear to auscultation bilaterally. No wheezes. No rhonchi.  Cardiovascular: S1, S2. Regular rate and rhythm. No murmurs.  Abdomen: Soft, nontender, nondistended.  Positive bowel sounds. No rebound or guarding.  Extremities: No edema.  Neuro:  Grossly non focal, no motor deficits.       -----------------------------------------------------------------------------------------------  PATIENT DISCHARGE INSTRUCTIONS: See electronic chart    Margarita Welch MD    Time spent:  > 30 minutes

## 2024-04-08 ENCOUNTER — APPOINTMENT (OUTPATIENT)
Dept: INTERVENTIONAL RADIOLOGY/VASCULAR | Facility: HOSPITAL | Age: 69
End: 2024-04-08
Attending: UROLOGY
Payer: MEDICARE

## 2024-04-08 ENCOUNTER — ANESTHESIA (OUTPATIENT)
Dept: SURGERY | Facility: HOSPITAL | Age: 69
End: 2024-04-08
Payer: MEDICARE

## 2024-04-08 ENCOUNTER — HOSPITAL ENCOUNTER (INPATIENT)
Facility: HOSPITAL | Age: 69
LOS: 3 days | Discharge: HOME HEALTH | End: 2024-04-11
Attending: UROLOGY | Admitting: UROLOGY
Payer: MEDICARE

## 2024-04-08 ENCOUNTER — APPOINTMENT (OUTPATIENT)
Dept: GENERAL RADIOLOGY | Facility: HOSPITAL | Age: 69
End: 2024-04-08
Attending: UROLOGY
Payer: MEDICARE

## 2024-04-08 ENCOUNTER — ANESTHESIA EVENT (OUTPATIENT)
Dept: SURGERY | Facility: HOSPITAL | Age: 69
End: 2024-04-08
Payer: MEDICARE

## 2024-04-08 PROBLEM — N20.0 STAGHORN RENAL CALCULUS: Status: ACTIVE | Noted: 2024-04-08

## 2024-04-08 LAB
GLUCOSE BLD-MCNC: 82 MG/DL (ref 70–99)
GLUCOSE BLD-MCNC: 84 MG/DL (ref 70–99)

## 2024-04-08 PROCEDURE — 82962 GLUCOSE BLOOD TEST: CPT

## 2024-04-08 PROCEDURE — 50389 REMOVE RENAL TUBE W/FLUORO: CPT | Performed by: RADIOLOGY

## 2024-04-08 PROCEDURE — BT111ZZ FLUOROSCOPY OF RIGHT KIDNEY USING LOW OSMOLAR CONTRAST: ICD-10-PCS | Performed by: UROLOGY

## 2024-04-08 PROCEDURE — 0TJ53ZZ INSPECTION OF KIDNEY, PERCUTANEOUS APPROACH: ICD-10-PCS | Performed by: RADIOLOGY

## 2024-04-08 PROCEDURE — 0TP5X0Z REMOVAL OF DRAINAGE DEVICE FROM KIDNEY, EXTERNAL APPROACH: ICD-10-PCS | Performed by: UROLOGY

## 2024-04-08 RX ORDER — ACETAMINOPHEN 500 MG
1000 TABLET ORAL ONCE
Status: DISCONTINUED | OUTPATIENT
Start: 2024-04-08 | End: 2024-04-08 | Stop reason: HOSPADM

## 2024-04-08 RX ORDER — ROCURONIUM BROMIDE 10 MG/ML
INJECTION, SOLUTION INTRAVENOUS AS NEEDED
Status: DISCONTINUED | OUTPATIENT
Start: 2024-04-08 | End: 2024-04-08 | Stop reason: SURG

## 2024-04-08 RX ORDER — HYDROMORPHONE HYDROCHLORIDE 1 MG/ML
0.2 INJECTION, SOLUTION INTRAMUSCULAR; INTRAVENOUS; SUBCUTANEOUS EVERY 5 MIN PRN
Status: DISCONTINUED | OUTPATIENT
Start: 2024-04-08 | End: 2024-04-08 | Stop reason: HOSPADM

## 2024-04-08 RX ORDER — DIPHENHYDRAMINE HYDROCHLORIDE 50 MG/ML
12.5 INJECTION INTRAMUSCULAR; INTRAVENOUS NIGHTLY PRN
Status: DISCONTINUED | OUTPATIENT
Start: 2024-04-08 | End: 2024-04-11

## 2024-04-08 RX ORDER — ONDANSETRON 2 MG/ML
4 INJECTION INTRAMUSCULAR; INTRAVENOUS EVERY 6 HOURS PRN
Status: DISCONTINUED | OUTPATIENT
Start: 2024-04-08 | End: 2024-04-11

## 2024-04-08 RX ORDER — GLYCOPYRROLATE 0.2 MG/ML
INJECTION, SOLUTION INTRAMUSCULAR; INTRAVENOUS AS NEEDED
Status: DISCONTINUED | OUTPATIENT
Start: 2024-04-08 | End: 2024-04-08 | Stop reason: SURG

## 2024-04-08 RX ORDER — DIPHENHYDRAMINE HCL 25 MG
25 CAPSULE ORAL NIGHTLY PRN
Status: DISCONTINUED | OUTPATIENT
Start: 2024-04-08 | End: 2024-04-11

## 2024-04-08 RX ORDER — ONDANSETRON 4 MG/1
4 TABLET, FILM COATED ORAL EVERY 6 HOURS PRN
Status: DISCONTINUED | OUTPATIENT
Start: 2024-04-08 | End: 2024-04-11

## 2024-04-08 RX ORDER — DEXAMETHASONE SODIUM PHOSPHATE 4 MG/ML
VIAL (ML) INJECTION AS NEEDED
Status: DISCONTINUED | OUTPATIENT
Start: 2024-04-08 | End: 2024-04-08 | Stop reason: SURG

## 2024-04-08 RX ORDER — SODIUM CHLORIDE, SODIUM LACTATE, POTASSIUM CHLORIDE, CALCIUM CHLORIDE 600; 310; 30; 20 MG/100ML; MG/100ML; MG/100ML; MG/100ML
INJECTION, SOLUTION INTRAVENOUS CONTINUOUS
Status: DISCONTINUED | OUTPATIENT
Start: 2024-04-08 | End: 2024-04-08 | Stop reason: HOSPADM

## 2024-04-08 RX ORDER — ENEMA 19; 7 G/133ML; G/133ML
1 ENEMA RECTAL ONCE AS NEEDED
Status: DISCONTINUED | OUTPATIENT
Start: 2024-04-08 | End: 2024-04-11

## 2024-04-08 RX ORDER — SENNOSIDES 8.6 MG
17.2 TABLET ORAL NIGHTLY PRN
Status: DISCONTINUED | OUTPATIENT
Start: 2024-04-08 | End: 2024-04-11

## 2024-04-08 RX ORDER — BUPIVACAINE HYDROCHLORIDE AND EPINEPHRINE 5; 5 MG/ML; UG/ML
INJECTION, SOLUTION EPIDURAL; INTRACAUDAL; PERINEURAL AS NEEDED
Status: DISCONTINUED | OUTPATIENT
Start: 2024-04-08 | End: 2024-04-08 | Stop reason: HOSPADM

## 2024-04-08 RX ORDER — INSULIN ASPART 100 [IU]/ML
INJECTION, SOLUTION INTRAVENOUS; SUBCUTANEOUS ONCE
Status: DISCONTINUED | OUTPATIENT
Start: 2024-04-08 | End: 2024-04-08 | Stop reason: HOSPADM

## 2024-04-08 RX ORDER — ACETAMINOPHEN 500 MG
500 TABLET ORAL EVERY 6 HOURS PRN
Status: DISCONTINUED | OUTPATIENT
Start: 2024-04-08 | End: 2024-04-11

## 2024-04-08 RX ORDER — LIDOCAINE HYDROCHLORIDE 10 MG/ML
INJECTION, SOLUTION EPIDURAL; INFILTRATION; INTRACAUDAL; PERINEURAL AS NEEDED
Status: DISCONTINUED | OUTPATIENT
Start: 2024-04-08 | End: 2024-04-08 | Stop reason: SURG

## 2024-04-08 RX ORDER — IBUPROFEN 200 MG
600 TABLET ORAL EVERY 6 HOURS PRN
Status: CANCELLED | OUTPATIENT
Start: 2024-04-08

## 2024-04-08 RX ORDER — MORPHINE SULFATE 4 MG/ML
2 INJECTION, SOLUTION INTRAMUSCULAR; INTRAVENOUS
Status: DISCONTINUED | OUTPATIENT
Start: 2024-04-08 | End: 2024-04-11

## 2024-04-08 RX ORDER — LIDOCAINE HYDROCHLORIDE 40 MG/ML
INJECTION, SOLUTION RETROBULBAR AS NEEDED
Status: DISCONTINUED | OUTPATIENT
Start: 2024-04-08 | End: 2024-04-08 | Stop reason: SURG

## 2024-04-08 RX ORDER — ONDANSETRON 2 MG/ML
4 INJECTION INTRAMUSCULAR; INTRAVENOUS EVERY 6 HOURS PRN
Status: DISCONTINUED | OUTPATIENT
Start: 2024-04-08 | End: 2024-04-08 | Stop reason: HOSPADM

## 2024-04-08 RX ORDER — NALOXONE HYDROCHLORIDE 0.4 MG/ML
0.08 INJECTION, SOLUTION INTRAMUSCULAR; INTRAVENOUS; SUBCUTANEOUS AS NEEDED
Status: DISCONTINUED | OUTPATIENT
Start: 2024-04-08 | End: 2024-04-08 | Stop reason: HOSPADM

## 2024-04-08 RX ORDER — HYDROCODONE BITARTRATE AND ACETAMINOPHEN 10; 325 MG/1; MG/1
1 TABLET ORAL EVERY 4 HOURS PRN
Status: DISCONTINUED | OUTPATIENT
Start: 2024-04-08 | End: 2024-04-11

## 2024-04-08 RX ORDER — SODIUM CHLORIDE, SODIUM LACTATE, POTASSIUM CHLORIDE, CALCIUM CHLORIDE 600; 310; 30; 20 MG/100ML; MG/100ML; MG/100ML; MG/100ML
INJECTION, SOLUTION INTRAVENOUS CONTINUOUS
Status: DISCONTINUED | OUTPATIENT
Start: 2024-04-08 | End: 2024-04-11

## 2024-04-08 RX ORDER — NICOTINE POLACRILEX 4 MG
15 LOZENGE BUCCAL
Status: DISCONTINUED | OUTPATIENT
Start: 2024-04-08 | End: 2024-04-08 | Stop reason: HOSPADM

## 2024-04-08 RX ORDER — HYDROMORPHONE HYDROCHLORIDE 1 MG/ML
0.6 INJECTION, SOLUTION INTRAMUSCULAR; INTRAVENOUS; SUBCUTANEOUS EVERY 5 MIN PRN
Status: DISCONTINUED | OUTPATIENT
Start: 2024-04-08 | End: 2024-04-08 | Stop reason: HOSPADM

## 2024-04-08 RX ORDER — ONDANSETRON 2 MG/ML
INJECTION INTRAMUSCULAR; INTRAVENOUS AS NEEDED
Status: DISCONTINUED | OUTPATIENT
Start: 2024-04-08 | End: 2024-04-08 | Stop reason: SURG

## 2024-04-08 RX ORDER — NEOSTIGMINE METHYLSULFATE 1 MG/ML
INJECTION, SOLUTION INTRAVENOUS AS NEEDED
Status: DISCONTINUED | OUTPATIENT
Start: 2024-04-08 | End: 2024-04-08 | Stop reason: SURG

## 2024-04-08 RX ORDER — POLYETHYLENE GLYCOL 3350 17 G/17G
17 POWDER, FOR SOLUTION ORAL DAILY PRN
Status: DISCONTINUED | OUTPATIENT
Start: 2024-04-08 | End: 2024-04-11

## 2024-04-08 RX ORDER — BISACODYL 10 MG
10 SUPPOSITORY, RECTAL RECTAL
Status: DISCONTINUED | OUTPATIENT
Start: 2024-04-08 | End: 2024-04-11

## 2024-04-08 RX ORDER — DEXTROSE MONOHYDRATE 25 G/50ML
50 INJECTION, SOLUTION INTRAVENOUS
Status: DISCONTINUED | OUTPATIENT
Start: 2024-04-08 | End: 2024-04-08 | Stop reason: HOSPADM

## 2024-04-08 RX ORDER — CEFAZOLIN SODIUM/WATER 2 G/20 ML
2 SYRINGE (ML) INTRAVENOUS ONCE
Status: COMPLETED | OUTPATIENT
Start: 2024-04-08 | End: 2024-04-08

## 2024-04-08 RX ORDER — HYDROMORPHONE HYDROCHLORIDE 1 MG/ML
0.4 INJECTION, SOLUTION INTRAMUSCULAR; INTRAVENOUS; SUBCUTANEOUS EVERY 5 MIN PRN
Status: DISCONTINUED | OUTPATIENT
Start: 2024-04-08 | End: 2024-04-08 | Stop reason: HOSPADM

## 2024-04-08 RX ORDER — NICOTINE POLACRILEX 4 MG
30 LOZENGE BUCCAL
Status: DISCONTINUED | OUTPATIENT
Start: 2024-04-08 | End: 2024-04-08 | Stop reason: HOSPADM

## 2024-04-08 RX ORDER — METOCLOPRAMIDE HYDROCHLORIDE 5 MG/ML
10 INJECTION INTRAMUSCULAR; INTRAVENOUS EVERY 8 HOURS PRN
Status: DISCONTINUED | OUTPATIENT
Start: 2024-04-08 | End: 2024-04-08 | Stop reason: HOSPADM

## 2024-04-08 RX ORDER — LIDOCAINE HYDROCHLORIDE 10 MG/ML
INJECTION, SOLUTION INFILTRATION; PERINEURAL
Status: COMPLETED
Start: 2024-04-08 | End: 2024-04-08

## 2024-04-08 RX ADMIN — ROCURONIUM BROMIDE 50 MG: 10 INJECTION, SOLUTION INTRAVENOUS at 13:57:00

## 2024-04-08 RX ADMIN — LIDOCAINE HYDROCHLORIDE 80 MG: 10 INJECTION, SOLUTION EPIDURAL; INFILTRATION; INTRACAUDAL; PERINEURAL at 13:57:00

## 2024-04-08 RX ADMIN — LIDOCAINE HYDROCHLORIDE 3 ML: 40 INJECTION, SOLUTION RETROBULBAR at 14:00:00

## 2024-04-08 RX ADMIN — CEFAZOLIN SODIUM/WATER 2 G: 2 G/20 ML SYRINGE (ML) INTRAVENOUS at 14:15:00

## 2024-04-08 RX ADMIN — SODIUM CHLORIDE, SODIUM LACTATE, POTASSIUM CHLORIDE, CALCIUM CHLORIDE: 600; 310; 30; 20 INJECTION, SOLUTION INTRAVENOUS at 16:29:00

## 2024-04-08 RX ADMIN — NEOSTIGMINE METHYLSULFATE 3 MG: 1 INJECTION, SOLUTION INTRAVENOUS at 16:15:00

## 2024-04-08 RX ADMIN — ONDANSETRON 4 MG: 2 INJECTION INTRAMUSCULAR; INTRAVENOUS at 16:21:00

## 2024-04-08 RX ADMIN — DEXAMETHASONE SODIUM PHOSPHATE 8 MG: 4 MG/ML VIAL (ML) INJECTION at 14:15:00

## 2024-04-08 RX ADMIN — GLYCOPYRROLATE 0.4 MG: 0.2 INJECTION, SOLUTION INTRAMUSCULAR; INTRAVENOUS at 16:15:00

## 2024-04-08 NOTE — H&P
[facility]  H&P    America Scott Patient Status:  Surgery Admit - Inpt    1955 MRN [id number]   Location [facility] SURGERY Attending Jesus Martinez MD   Hosp Day # 0 PCP Agnes Sawyer MD     Impression and Plan:  Right staghorn calculus. PCNL planned.  Jesus Martinez MD  2024  11:24 AM      History of Present Illness:  America Scott was admited with a large right staghorn calculus, will likely need 2nd stage procedure. Already has nephrostomy tube so no need for IR to see.     Allergies:  No Known Allergies    Medications:    Outpatient Medications Marked as Taking for the 24 encounter (Hospital Encounter)   Medication Sig Dispense Refill    acetaminophen 500 MG Oral Tab Take 1 tablet (500 mg total) by mouth every 6 (six) hours as needed for Pain.      CEPHALEXIN 500 MG Oral Cap Take 1 capsule (500 mg total) by mouth 4 (four) times daily. Take 4 tabs by mouth daily for 3 days and stop; Then take 2 tabs daily starting 2 days prior to stone surgery and continue for 5 days 22 capsule 0       No current facility-administered medications for this encounter.       History:  Past Medical History:   Diagnosis Date    Back problem     Calculus of kidney     Diabetes (HCC)     NO CURRENT MEDICATION    Edema     Essential hypertension     High blood pressure     NO CURRENT MEDICATION    High cholesterol     NO CURRENT MEDICATION    Hx of motion sickness     Muscle weakness     USING A CANE/WALKER    Neuropathy     Osteoarthritis     PONV (postoperative nausea and vomiting)     Visual impairment     GLASSES       Past Surgical History:   Procedure Laterality Date    TONSILLECTOMY      TUBAL LIGATION         History reviewed. No pertinent family history.    Social History     Socioeconomic History    Marital status: Single     Spouse name: Not on file    Number of children: Not on file    Years of education: Not on file    Highest education level: Not on file   Occupational History    Not on file    Tobacco Use    Smoking status: Former     Packs/day: 0.75     Years: 35.00     Additional pack years: 0.00     Total pack years: 26.25     Types: Cigarettes    Smokeless tobacco: Never   Vaping Use    Vaping Use: Never used   Substance and Sexual Activity    Alcohol use: Not Currently    Drug use: Not Currently    Sexual activity: Not on file   Other Topics Concern    Not on file   Social History Narrative    Not on file     Social Determinants of Health     Financial Resource Strain: Not on file   Food Insecurity: Food Insecurity Present (3/18/2024)    Food Insecurity     Food Insecurity: Sometimes true   Transportation Needs: No Transportation Needs (3/18/2024)    Transportation Needs     Lack of Transportation: No   Physical Activity: Not on file   Stress: Not on file   Social Connections: Not on file   Housing Stability: Low Risk  (3/18/2024)    Housing Stability     Housing Instability: No     Housing Instability Emergency: Not on file       Review of Systems:  No SOB, angina, fevers, focal weakness, and as in HPI.    Physical Exam:  Ht 5' 3\" (1.6 m)   Wt 140 lb (63.5 kg)   BMI 24.80 kg/m²       Jesus Martinez M.D.  [facility], Urology   [phone]  @

## 2024-04-08 NOTE — ANESTHESIA PREPROCEDURE EVALUATION
PRE-OP EVALUATION    Patient Name: America Scott    Admit Diagnosis: STAGHORN RENAL CALCULUS    Pre-op Diagnosis: STAGHORN RENAL CALCULUS    RIGHT PERCUTANEOUS NEPHROLITHOTOMY    Anesthesia Procedure: RIGHT PERCUTANEOUS NEPHROLITHOTOMY (Right)    Surgeon(s) and Role:     * Jesus Martinez MD - Primary    Pre-op vitals reviewed.  Temp: 98.6 °F (37 °C)  Pulse: 70  Resp: 18  BP: 134/58  SpO2: 100 %  Body mass index is 23.91 kg/m².    Current medications reviewed.  Hospital Medications:   ceFAZolin (Ancef) 2 g in 20mL IV syringe premix  2 g Intravenous Once    [MAR Hold] acetaminophen (Tylenol Extra Strength) tab 1,000 mg  1,000 mg Oral Once    [MAR Hold] glucose (Dex4) 15 GM/59ML oral liquid 15 g  15 g Oral Q15 Min PRN    Or    [MAR Hold] glucose (Glutose) 40% oral gel 15 g  15 g Oral Q15 Min PRN    Or    [MAR Hold] glucose-vitamin C (Dex-4) chewable tab 4 tablet  4 tablet Oral Q15 Min PRN    Or    [MAR Hold] dextrose 50% injection 50 mL  50 mL Intravenous Q15 Min PRN    Or    [MAR Hold] glucose (Dex4) 15 GM/59ML oral liquid 30 g  30 g Oral Q15 Min PRN    Or    [MAR Hold] glucose (Glutose) 40% oral gel 30 g  30 g Oral Q15 Min PRN    Or    [MAR Hold] glucose-vitamin C (Dex-4) chewable tab 8 tablet  8 tablet Oral Q15 Min PRN    lactated ringers infusion   Intravenous Continuous       Outpatient Medications:     Medications Prior to Admission   Medication Sig Dispense Refill Last Dose    acetaminophen 500 MG Oral Tab Take 1 tablet (500 mg total) by mouth every 6 (six) hours as needed for Pain.   Past Week    CEPHALEXIN 500 MG Oral Cap Take 1 capsule (500 mg total) by mouth 4 (four) times daily. Take 4 tabs by mouth daily for 3 days and stop; Then take 2 tabs daily starting 2 days prior to stone surgery and continue for 5 days 22 capsule 0 4/7/2024 at 1900       Allergies: Patient has no known allergies.      Anesthesia Evaluation    Patient summary reviewed.    Anesthetic Complications  (-) history of anesthetic  complications         GI/Hepatic/Renal  Comment: Renal calculi                                Cardiovascular        Exercise tolerance: good     MET: >4      (+) hypertension and well controlled                                    Endo/Other      (+) diabetes and well controlled, type 2, not using insulin                         Pulmonary    Negative pulmonary ROS.                       Neuro/Psych    Negative neuro/psych ROS.                                  Past Surgical History:   Procedure Laterality Date    TONSILLECTOMY      TUBAL LIGATION       Social History     Socioeconomic History    Marital status: Single   Tobacco Use    Smoking status: Former     Packs/day: 0.75     Years: 35.00     Additional pack years: 0.00     Total pack years: 26.25     Types: Cigarettes    Smokeless tobacco: Never   Vaping Use    Vaping Use: Never used   Substance and Sexual Activity    Alcohol use: Not Currently    Drug use: Not Currently     History   Drug Use Unknown     Available pre-op labs reviewed.  Lab Results   Component Value Date    WBC 8.3 03/24/2024    RBC 3.63 (L) 03/24/2024    HGB 9.7 (L) 03/24/2024    HCT 31.0 (L) 03/24/2024    MCV 85.4 03/24/2024    MCH 26.7 03/24/2024    MCHC 31.3 03/24/2024    RDW 14.5 03/24/2024    .0 03/24/2024     Lab Results   Component Value Date     03/24/2024    K 3.9 03/24/2024     (H) 03/24/2024    CO2 28.0 03/24/2024    BUN 14 03/24/2024    CREATSERUM 0.94 03/24/2024    GLU 86 03/24/2024    CA 9.2 03/24/2024            Airway      Mallampati: II  Mouth opening: >3 FB  TM distance: 4 - 6 cm  Neck ROM: full Cardiovascular    Cardiovascular exam normal.  Rhythm: regular  Rate: normal     Dental  Comment: Pt denies any loose teeth    Dental appliance(s): partials       Pulmonary    Pulmonary exam normal.                 Other findings              ASA: 2   Plan: general  NPO status verified and patient meets guidelines.    Post-procedure pain management plan discussed  with surgeon and patient.    Comment: I explained the intrinsics of general anesthesia to America Scott, common downsides like sleepiness, PONV, sore throat and hoarseness from airway manipulation, post-operative pain/discomfort, as well as those rare incidents like aspiration, dental /lip injury, corneal abrasion, pressure/nerve injuries from positioning, and exceedingly rare events like intraoperative awareness, allergic/febrile reactions, and life-threatening cardiopulmonary events. All questions were answered and patient demonstrated understanding of realistic expectations and risks of undergoing general anesthesia. Informed consent was signed by patient.        Plan/risks discussed with: patient and child/children                Present on Admission:  **None**

## 2024-04-08 NOTE — ANESTHESIA POSTPROCEDURE EVALUATION
[facility]    America Scott Patient Status:  Inpatient   Age/Gender 68 year old female MRN [id number]   Location [facility] SURGERY Attending Jesus Martinez MD   Hosp Day # 0 PCP Agnes Sawyer MD       Anesthesia Post-op Note    RIGHT ANTEGRADE PYELOGRAM, ATTEMPTED PERCUTANEOUS NEPHROLITHOTOMY    Procedure Summary       Date: 04/08/24 Room / Location:  MAIN OR 14 / [facility] MAIN OR    Anesthesia Start: 1353 Anesthesia Stop: 1628    Procedure: RIGHT ANTEGRADE PYELOGRAM, ATTEMPTED PERCUTANEOUS NEPHROLITHOTOMY (Right: Flank) Diagnosis: (STAGHORN RENAL CALCULUS)    Surgeons: Jesus Martinez MD Anesthesiologist: Max Jean MD    Anesthesia Type: general ASA Status: 2            Anesthesia Type: general    Vitals Value Taken Time   /67 04/08/24 1628   Temp 98.4 04/08/24 1628   Pulse 82 04/08/24 1628   Resp 14 04/08/24 1628   SpO2 100 04/08/24 1628       Patient Location: PACU    Anesthesia Type: general    Airway Patency: patent and extubated    Postop Pain Control: adequate    Mental Status: mildly sedated but able to meaningfully participate in the post-anesthesia evaluation    Nausea/Vomiting: none    Cardiopulmonary/Hydration status: stable euvolemic    Complications: no apparent anesthesia related complications    Postop vital signs: stable    Dental Exam: Unchanged from Preop    Patient to be discharged from PACU when criteria met.

## 2024-04-08 NOTE — ANESTHESIA PROCEDURE NOTES
Airway  Date/Time: 4/8/2024 2:00 PM  Urgency: elective    Airway not difficult    General Information and Staff    Patient location during procedure: OR  Anesthesiologist: Max Jean MD  Performed: anesthesiologist   Performed by: Max Jean MD  Authorized by: Max Jean MD      Indications and Patient Condition  Indications for airway management: anesthesia  Sedation level: deep  Preoxygenated: yes  Patient position: sniffing  Mask difficulty assessment: 1 - vent by mask    Final Airway Details  Final airway type: endotracheal airway      Successful airway: ETT  Cuffed: yes   Successful intubation technique: Video laryngoscopy  Endotracheal tube insertion site: oral  Blade type: One Scope.  Blade size: #3  ETT size (mm): 7.0    Cormack-Lehane Classification: grade I - full view of glottis  Placement verified by: capnometry   Cuff volume (mL): 8  Measured from: lips  ETT to lips (cm): 21  Number of attempts at approach: 1    Additional Comments  Atraumatic laryngoscopy and endotracheal intubation- no lip/teeth injury noted

## 2024-04-08 NOTE — OPERATIVE REPORT
Operative Note  [facility]    America Scott Patient Status:  Inpatient    1955 MRN [id number]   Location [facility] POST ANESTHESIA CARE UNIT Attending Jesus Martinez MD   Hosp Day # 0 PCP Anges Sawyer MD     Date of procedure:  2024    Pre-Op Diagnosis:  Right Staghorn Renal Calculus    Procedure: Right Antegrade Nephrostogram, Attempted PCNL, Removal of Right Nephrostomy Tube    Post-Op Diagnosis: Right Renal Calculus, Obstructed Right Nephrostomy Tube    Surgeon: Jesus Martinez M.D.     Assistant: ZENOBIA Tim M.D.    Anesthesia:  General    Indications: 6 cm staghorn renal calculus.  She had presented with pyelonephritis 3 weeks ago and a large staghorn right renal calculus.  A nephrostomy tube was placed.  She is treated for the UTI.  She returned for PCNL.    Findings: Large, right staghorn renal calculus.  Obstructed right nephrostomy tube.  Could not gain access to the kidney and the interventional radiologist was unable to establish access.  She will stay overnight and IR will make another attempt to place a nephrostomy tube tomorrow.  Will need to reschedule the PCNL.    Specimens: Calculus Fragments    Blood Loss:  1 ml    Complications:  None    Drains:  Nephrostomy tube    Technique:                               A general anesthesia was induced.  Preoperative  antibiotics were administered.  A Mcginnis catheter was placed. The patient was prepped and draped in  the prone position.  The patient was extensively padded with chest rolls, kneepads et cetera. The arms round arm boards extended towards the head. Sequential compression devices were  in place on the lower legs.  A time-out was reviewed.     Contrast was injected through the nephrostomy tract, demonstrating the stones are filling the renal pelvis and several of the calyces.  There was a nephrostomy tube in good position over the main body of the stone and entering through the lower pole calyx.  I injected contrast and there was a  little bit of resistance to injecting the contrast but it did fill the lower pole calyx around the lower pole stone and then easily go down the ureter.  There was no hydronephrosis.  I tried to pass a guidewire into the nephrostomy tube and this met obstruction at the hub.  I cut off the hub and then advanced a guidewire down the nephrostomy tube but it would only go about USP down the tube.  I tried several different guidewires and even an Amplatz guidewire loaded backwards to see if I could get through the obstruction.  I could feel some gritty stonelike material as I moved the guidewire.  Urine however would drain through the nephrostomy tube.  It became evident that I would not be able to use this access even though it was only about 3 weeks old.  I called down and requested interventional radiology to try to reestablish a patent percutaneous access.    Dr. Tim and I tried to pass wires through the nephrostomy tube and then even alongside the tube.  We ultimately decided to remove the nephrostomy tube and it came out easily.  Retried to pass guidewire through the existing tract.  This was unsuccessful and then he attempted a new percutaneous stick using fluoroscopic and ultrasound guidance.  He could not easily reestablish a nephrostomy tube and we decided to allow him to try to replace it tomorrow in the interventional radiology suite.    Jesus Martinez MD  4/8/2024

## 2024-04-08 NOTE — CONSULTS
General Medicine Consult      Consulted by: Jesus Martinez MD    PCP: Agnes Sawyer MD    Reason for Consultation: Medical Management    History of Present Illness: Patient is a 68 year old female with PMH including but not limited to DM, HTN, neuropathy who p/t EH for scheduled surgery by Jesus Farias MD. Pt is currently POD 0. Feels ok, no n/v. Feels cold. Some soreness from prior nephrostomy tube.   Pt saw Agnes Sawyer MD 3/24 and I reviewed the note. Pt denies any significant history of cardiac or pulmonary conditions. Quiting smoking this past year, denies etoh.     PMH:  Past Medical History:   Diagnosis Date    Back problem     Calculus of kidney     Diabetes (HCC)     NO CURRENT MEDICATION    Edema     Essential hypertension     High blood pressure     NO CURRENT MEDICATION    High cholesterol     NO CURRENT MEDICATION    Hx of motion sickness     Muscle weakness     USING A CANE/WALKER    Neuropathy     Osteoarthritis     PONV (postoperative nausea and vomiting)     Visual impairment     GLASSES        PSH:  Past Surgical History:   Procedure Laterality Date    TONSILLECTOMY      TUBAL LIGATION          HOME MEDS  ceFAZolin, 1 g, Intravenous, Q8H        ALL:  No Known Allergies     Soc Hx:  Social History     Tobacco Use    Smoking status: Former     Packs/day: 0.75     Years: 35.00     Additional pack years: 0.00     Total pack years: 26.25     Types: Cigarettes    Smokeless tobacco: Never   Substance Use Topics    Alcohol use: Not Currently        Fam Hx  History reviewed. No pertinent family history.    Review of Systems  Comprehensive ROS reviewed and negative except for what's stated above.  Including negative for chest pain, shortness of breath, syncope.       OBJECTIVE:  /68   Pulse 82   Temp 97.5 °F (36.4 °C) (Temporal)   Resp 12   Ht 5' 3\" (1.6 m)   Wt 135 lb (61.2 kg)   SpO2 100%   BMI 23.91 kg/m²     General:  Alert, no distress, appears stated age.     Head:  Normocephalic,  without obvious abnormality, atraumatic.   Eyes:  Sclera anicteric, EOMs intact.    Nose: Nares normal,  Mucosa normal    Throat: Lips normal   Neck: Supple, symmetrical, trachea midline   Lungs:   Clear to auscultation bilaterally. Normal effort   Chest wall:  No tenderness or deformity   Heart:  Regular rate and rhythm, S1, S2 normal, no murmur, rub or gallop appreciated   Abdomen:   Soft, NT/ND, Bowel sounds normal. No masses,  No organomegaly.    Extremities: Extremities normal, atraumatic, no cyanosis or LE edema.   Skin: Skin color, texture, turgor normal. No rashes or lesions.    Neurologic: Moving all extremities spontaneously, no focal deficit appreciated          Diagnostics:   CBC/Chem  No results for input(s): \"WBC\", \"HGB\", \"MCV\", \"PLT\", \"BAND\", \"INR\" in the last 168 hours.    Invalid input(s): \"LYM#\", \"MONO#\", \"BASOS#\", \"EOSIN#\"    No results for input(s): \"NA\", \"K\", \"CL\", \"CO2\", \"BUN\", \"CREATSERUM\", \"GLU\", \"CA\", \"CAION\", \"MG\", \"PHOS\" in the last 168 hours.    No results for input(s): \"ALT\", \"AST\", \"ALB\", \"AMYLASE\", \"LIPASE\", \"LDH\" in the last 168 hours.    Invalid input(s): \"ALPHOS\", \"TBIL\", \"DBIL\", \"TPROT\"      Radiology: XR OR - N/C    Result Date: 4/8/2024  PROCEDURE:  XR OR - N/C  COMPARISON:  None.  INDICATIONS:  RIGHT PERCUTANEOUS NEPHROLITHOTOMY (Right)  TECHNIQUE:   FLUOROSCOPY IMAGES OBTAINED:  4 FLUOROSCOPY TIME:  9 minutes 26 seconds TECHNOLOGIST TIME:  2 hours RADIATION DOSE (AIR KERMA PRODUCT):  120.56uGy/m2   FINDINGS:  Fluoroscopic guidance for urologic procedure.            CONCLUSION:  Fluoroscopic guidance for urologic procedure.   LOCATION:  [facility]    Dictated by (CST): Freddy Spencer MD on 4/08/2024 at 4:41 PM     Finalized by (CST): Freddy Spencer MD on 4/08/2024 at 4:42 PM       US VENOUS DOPPLER LEG BILAT - DIAG IMG (CPT=93970)    Result Date: 3/24/2024  PROCEDURE:  US VENOUS DOPPLER LEG BILAT - DIAG IMG (CPT=93970)  COMPARISON:  None.  INDICATIONS:  eval for DVT  TECHNIQUE:  Real  time, grey scale, and duplex ultrasound was used to evaluate the lower extremity venous system. B-mode two-dimensional images of the vascular structures, Doppler spectral analysis, and color flow.  Doppler imaging were performed.  The following veins were imaged bilaterally:  Common, deep, and superficial femoral, popliteal, sapheno-femoral junction, and posterior tibial veins.  PATIENT STATED HISTORY: (As transcribed by Technologist)     FINDINGS:  SAPHENOFEMORAL JUNCTION:  No reflux. THROMBI:  None visible. COMPRESSION:  Normal compressibility, phasicity, and augmentation. OTHER:  There is moderate soft tissue edema in the calves bilaterally..            CONCLUSION:  Limited evaluation secondary to soft tissue edematous changes in the calves.  There is no sonographic evidence of DVT in either leg.   LOCATION:  [facility]   Dictated by (CST): Lucila Kyle MD on 3/24/2024 at 7:40 AM     Finalized by (CST): Lucila Kyle MD on 3/24/2024 at 7:41 AM       IR NEPHROSTOMY TUBE    Result Date: 3/18/2024  PROCEDURE:  IR NEPHROSTOMY TUBE INSERTION EXCHANGE CHECK  COMPARISON:  None.  INDICATIONS:  Obstructing nephrolithiasis.  DESCRIPTION OF PROCEDURE:  Witnessed verbal and written informed consent was obtained.  Time out was performed by the staff.  TECHNIQUE: Prior to the procedure, I discussed with the patient and/or legal representative the potential benefits, risks, and side effects of this procedure, the likelihood of the patient achieving goals; and the potential problems that might occur during recuperation.  I discussed reasonable alternatives to the procedure, including risks, benefits, steps to prevent infection and side effects related to the alternatives, and risks related to not receiving this procedure. A witnessed verbal and signed consent was obtained and documented in the patient's chart.  IV was checked and maintained by the nurse. Moderate conscious sedation was performed under continuous pulse oximetry  and cardiac monitoring under my direct supervision.  The radiology nurse was in attendance throughout the exam. Moderate conscious sedation of 4 mg Versed and 200 mcg of Fentanyl was given.  Patient was assessed and monitoring of oxygen saturation, heart rate, and blood pressure by the nursing staff and myself during the exam for a total intraservice time of 25 minutes of conscious sedation time from 1556 to 1643.  500 milligrams of Levaquin were given pre-procedurally via existing IV.  The patient was placed prone on the angiographic table and the back was prepped and covered with a full body drape in the usual sterile fashion.  All operators present for the case performed standard pre-procedural prep including hand washing, sterile gloves, gown, mask, and cap.  All aspects of the maximum sterile barrier technique were followed.  A preprocedural time out was performed with all physicians, technologists, and nurses involved with the procedure.  Local anesthesia was obtained by 1% lidocaine.  Under sonographic guidance using an Accustick system a right posterior inferior calyx was accessed.  Urine returned through the needle.  A small amount of contrast was injected demonstrating correct position of the needle.  Then a wire was advanced into the right ureter under fluoroscopic guidance and the Accustick sheath was placed.  A Wilson wire was passed through the sheath with its tip in the distal ureter under fluoroscopic guidance.  Dilators were passed over the wire.  Then a 8.5 Fr nephrostomy tube was advanced into the renal pelvis   Urine returned through the tube.  A small amount of contrast was injected obtaining a nephrostogram.  RIGHT NEPHROSTOGRAM:  There was mild-to-moderate hydronephrosis.  Large staghorn calculus in the renal pelvis.  Lower pole caliceal calculi.  The distal aspect of the right nephrostomy tube was coiled in the renal pelvis and locked and the external portion was placed to bag gravity  drainage. The catheter was secured to the skin with non-absorbable suture.  The patient tolerated the procedure well without immediate complications.  Routine post nephrostomy tube instructions were communicated to the nursing staff and documented in the chart.  ESTIMATED BLOOD LOSS: Less than 5cc.  CONTRAST:  50 mL of Omnipaque 350 and Visipaque 320.  FLUOROSCOPY TIME/DOSE:  15.3 minutes  AIR KERMA:  171.37 mGy            CONCLUSION: Successful placement of right 8.5 Trinidadian nephrostomy tube  LOCATION:  [facility]    Dictated by (CST): Lalit Ramirez MD on 3/18/2024 at 5:03 PM     Finalized by (CST): Lalit Ramirez MD on 3/18/2024 at 5:05 PM       CT ABDOMEN+PELVIS(CONTRAST ONLY)(CPT=74177)    Result Date: 3/17/2024  PROCEDURE:  CT ABDOMEN+PELVIS (CONTRAST ONLY) (CPT=74177)  COMPARISON:  None.  INDICATIONS:  N/V, seen at [facility] in [place] on thursday  TECHNIQUE:  CT scanning was performed from the dome of the diaphragm to the pubic symphysis with non-ionic intravenous contrast material. Post contrast coronal MPR imaging was performed.  Dose reduction techniques were used. Dose information is transmitted to the ACR (American College of Radiology) NRDR (National Radiology Data Registry) which includes the Dose Index Registry.  PATIENT STATED HISTORY:(As transcribed by Technologist)  Right upper quadrant pain with nausea, vomiting and diarrhea.   CONTRAST USED:  80cc of Omnipaque 350  FINDINGS:  LUNG BASE:  Atelectasis. LIVER:  Homogeneous enhancement. BILIARY:  No biliary ductal dilatation. PANCREAS:  Homogeneous enhancement. SPLEEN:  Normal caliber. KIDNEYS:  Large staghorn calculus in the right renal pelvis and mid superior and inferior pole calices measuring 6.2 x 2.7 cm with moderate caliectasis involving the right kidney.. ADRENALS:  Normal. AORTA/VASCULAR:  No aneurysm. RETROPERITONEUM:  No enlarged adenopathy. BOWEL/MESENTERY:  Normal caliber appendix. Uncomplicated colonic diverticulosis ABDOMINAL WALL:  No ventral  wall hernia. PELVIC ORGANS:  Normal for age. BONES:  Degenerative disc disease L4-5 L5-S1.  Multilevel endplate hypertrophic changes.  Mild degenerative changes in the lower lumbar facets.             CONCLUSION:  Large staghorn calculus in the right kidney with partial obstruction and mild to  moderate hydronephrosis involving the superior middle and inferior pole calices of the right kidney.  Normal caliber appendix.  Colonic diverticulosis.  LOCATION:  [facility]   Dictated by (CST): Lucila Kyle MD on 3/17/2024 at 10:53 PM     Finalized by (CST): Lucila Kyle MD on 3/17/2024 at 10:57 PM            ASSESSMENT / PLAN:    68 year old female with PMH including but not limited to DM, HTN, neuropathy who p/t EH for scheduled surgery by Dr. Martinez.   # Right Renal Calculus, Obstructed Right Nephrostomy Tube   -S/P Right Antegrade Nephrostogram, Attempted PCNL, Removal of Right Nephrostomy Tube   -cont plan per .  increase activity as tolerated   -IS 10x/hr for atelectasis; Instructed on use of IS  -PO food/fluid per surgery, bowel regimen as needed  -anselmo/post-op abx per surgery, on ancef    # Acute pain, expected  -IV pain meds as needed, will monitor and encourage transition to PO pain meds as tolerated  -Bowel regimen for associated narcotic constipation    # DM  - not on meds? Follow glc    # HTN  - not on meds? Follow BP    # Tob abuse  -Pt was counseled on smoking cessation especially c wound healing; discussed multiple strategies including nicotine replacement and pharmacologic management, a total of 5 min spent on 4/9/2024  -pt trying to quiting smoking this past year    # DVT proph  -per      Dispo: cont post-op care\    Outpatient records records reviewed including Agnes Sawyer MD note       Patient given opportunity to ask questions and note understanding and agreeing with therapeutic plan as outlined. D/w RN       Thank you for allowing me to participate in the care of this patient.     Martin Soto  MD [facility] Hospitalist  Pager [phone]    4/8/2024  6:34 PM

## 2024-04-08 NOTE — H&P
H&P Update     Interviewed, examined pt, answered questions. No new history or findings.  Reviewed the plan, options, risks with the patient and her daughter today.  She prefers trying to manage the situation without a ureteral stent.  She seems fairly comfortable with her nephrostomy tube.  Lungs: clear  Heart: No M,G,R  Abd: soft, Non tender  Neuro: Alert. Moving all extr  Procede as planned; right PCNL.    Jesus Martinez M.D.  [facility], Urology   [phone]

## 2024-04-09 ENCOUNTER — APPOINTMENT (OUTPATIENT)
Dept: INTERVENTIONAL RADIOLOGY/VASCULAR | Facility: HOSPITAL | Age: 69
End: 2024-04-09
Attending: UROLOGY
Payer: MEDICARE

## 2024-04-09 LAB
ANION GAP SERPL CALC-SCNC: 5 MMOL/L (ref 0–18)
BUN BLD-MCNC: 22 MG/DL (ref 9–23)
CALCIUM BLD-MCNC: 10.1 MG/DL (ref 8.5–10.1)
CHLORIDE SERPL-SCNC: 109 MMOL/L (ref 98–112)
CO2 SERPL-SCNC: 23 MMOL/L (ref 21–32)
CREAT BLD-MCNC: 1.02 MG/DL
EGFRCR SERPLBLD CKD-EPI 2021: 61 ML/MIN/1.73M2 (ref 60–?)
EST. AVERAGE GLUCOSE BLD GHB EST-MCNC: 143 MG/DL (ref 68–126)
GLUCOSE BLD-MCNC: 166 MG/DL (ref 70–99)
GLUCOSE BLD-MCNC: 176 MG/DL (ref 70–99)
GLUCOSE BLD-MCNC: 73 MG/DL (ref 70–99)
GLUCOSE BLD-MCNC: 78 MG/DL (ref 70–99)
GLUCOSE BLD-MCNC: 84 MG/DL (ref 70–99)
GLUCOSE BLD-MCNC: 96 MG/DL (ref 70–99)
HBA1C MFR BLD: 6.6 % (ref ?–5.7)
HGB BLD-MCNC: 11 G/DL
INR BLD: 1.11 (ref 0.8–1.2)
OSMOLALITY SERPL CALC.SUM OF ELEC: 292 MOSM/KG (ref 275–295)
POTASSIUM SERPL-SCNC: 4.6 MMOL/L (ref 3.5–5.1)
PROTHROMBIN TIME: 14.3 SECONDS (ref 11.6–14.8)
SODIUM SERPL-SCNC: 137 MMOL/L (ref 136–145)

## 2024-04-09 PROCEDURE — 85610 PROTHROMBIN TIME: CPT | Performed by: UROLOGY

## 2024-04-09 PROCEDURE — 85018 HEMOGLOBIN: CPT | Performed by: UROLOGY

## 2024-04-09 PROCEDURE — 0T9030Z DRAINAGE OF RIGHT KIDNEY WITH DRAINAGE DEVICE, PERCUTANEOUS APPROACH: ICD-10-PCS | Performed by: RADIOLOGY

## 2024-04-09 PROCEDURE — 83036 HEMOGLOBIN GLYCOSYLATED A1C: CPT | Performed by: INTERNAL MEDICINE

## 2024-04-09 PROCEDURE — 82962 GLUCOSE BLOOD TEST: CPT

## 2024-04-09 PROCEDURE — 80048 BASIC METABOLIC PNL TOTAL CA: CPT | Performed by: UROLOGY

## 2024-04-09 PROCEDURE — 99153 MOD SED SAME PHYS/QHP EA: CPT | Performed by: RADIOLOGY

## 2024-04-09 PROCEDURE — BT141ZZ FLUOROSCOPY OF KIDNEYS, URETERS AND BLADDER USING LOW OSMOLAR CONTRAST: ICD-10-PCS | Performed by: RADIOLOGY

## 2024-04-09 PROCEDURE — 50433 PLMT NEPHROURETERAL CATHETER: CPT | Performed by: RADIOLOGY

## 2024-04-09 PROCEDURE — 99152 MOD SED SAME PHYS/QHP 5/>YRS: CPT | Performed by: RADIOLOGY

## 2024-04-09 RX ORDER — LIDOCAINE HYDROCHLORIDE 10 MG/ML
INJECTION, SOLUTION INFILTRATION; PERINEURAL
Status: COMPLETED
Start: 2024-04-09 | End: 2024-04-09

## 2024-04-09 RX ORDER — CEFAZOLIN SODIUM/WATER 2 G/20 ML
2 SYRINGE (ML) INTRAVENOUS
Status: COMPLETED | OUTPATIENT
Start: 2024-04-10 | End: 2024-04-10

## 2024-04-09 RX ORDER — LEVOFLOXACIN 5 MG/ML
INJECTION, SOLUTION INTRAVENOUS
Status: COMPLETED
Start: 2024-04-09 | End: 2024-04-09

## 2024-04-09 RX ORDER — NICOTINE POLACRILEX 4 MG
30 LOZENGE BUCCAL
Status: DISCONTINUED | OUTPATIENT
Start: 2024-04-09 | End: 2024-04-11

## 2024-04-09 RX ORDER — NICOTINE POLACRILEX 4 MG
15 LOZENGE BUCCAL
Status: DISCONTINUED | OUTPATIENT
Start: 2024-04-09 | End: 2024-04-11

## 2024-04-09 RX ORDER — DEXTROSE MONOHYDRATE 25 G/50ML
50 INJECTION, SOLUTION INTRAVENOUS
Status: DISCONTINUED | OUTPATIENT
Start: 2024-04-09 | End: 2024-04-11

## 2024-04-09 RX ORDER — PHENAZOPYRIDINE HYDROCHLORIDE 100 MG/1
200 TABLET, FILM COATED ORAL 3 TIMES DAILY PRN
Status: DISCONTINUED | OUTPATIENT
Start: 2024-04-09 | End: 2024-04-11

## 2024-04-09 RX ORDER — MIDAZOLAM HYDROCHLORIDE 1 MG/ML
INJECTION INTRAMUSCULAR; INTRAVENOUS
Status: COMPLETED
Start: 2024-04-09 | End: 2024-04-09

## 2024-04-09 NOTE — PLAN OF CARE
A&Ox4. VSS. RA. .  GI: Abdomen soft, nondistended. Denies passing gas.  Denies nausea.  : Mcginnis in place draining clear yellow urine   Pain controlled with PRN pain medications  Up with standby assist and a walker   Incisions: R lower back incision where nephrostomy tube was w/ steri strips, 4x4's, and pressure tape- C/D/I  Diet: NPO   IVF running per order.  All appropriate safety measures in place. All questions and concerns addressed.

## 2024-04-09 NOTE — PLAN OF CARE
A&Ox4. VSS. RA.   GI: Abdomen soft, non-distended without tenderness.  Denies nausea.  : Mcginnis catheter  Denies pain  Ambulates with cane.  Incisions: right flank neph. tube placement.  Diet: General, QID Accucheck.  IVF running per order.   All appropriate safety measures in place. All questions and concerns addressed.

## 2024-04-09 NOTE — PROGRESS NOTES
[facility] Hospitalist Progress Note     PCP: Agnes Sawyer MD    Chief Complaint: follow-up   Follow up for: There were no encounter diagnoses.    Overnight/Interim Events:      SUBJECTIVE:  Attempted to see earlier, at IR,.+nausea, little abd pain. 118/83      OBJECTIVE:  Temp:  [97.5 °F (36.4 °C)-99.3 °F (37.4 °C)] 97.6 °F (36.4 °C)  Pulse:  [] 70  Resp:  [12-19] 18  BP: (111-159)/(46-68) 118/53  SpO2:  [96 %-100 %] 100 %    Intake/Output:    Intake/Output Summary (Last 24 hours) at 4/9/2024 1614  Last data filed at 4/9/2024 1152  Gross per 24 hour   Intake 1440 ml   Output 1835 ml   Net -395 ml       Last 3 Weights   04/08/24 1829 135 lb (61.2 kg)   04/08/24 1232 135 lb (61.2 kg)   03/27/24 1322 140 lb (63.5 kg)   03/18/24 0116 141 lb 6.4 oz (64.1 kg)   03/17/24 2109 149 lb 14.6 oz (68 kg)   08/25/21 1436 145 lb (65.8 kg)       Exam    General: Alert, no distress, appears stated age.     Head:  Normocephalic, without obvious abnormality, atraumatic.   Eyes:  Sclera anicteric, EOMs intact.    Nose: Nares normal,  Mucosa normal    Throat: Lips normal   Neck: Supple, symmetrical, trachea midline   Lungs:   Clear to auscultation bilaterally. Normal effort   Chest wall:  No tenderness or deformity   Heart:  Regular rate and rhythm, S1, S2 normal, no murmur, rub or gallop appreciated   Abdomen:   Soft, NT/ND, Bowel sounds normal. No masses,  No organomegaly. NT in place    Extremities: Extremities normal, atraumatic, no cyanosis or LE edema.   Skin: Skin color, texture, turgor normal. No rashes or lesions.    Neurologic: Moving all extremities spontaneously, no focal deficit appreciated      Data Review:       Labs:     Recent Labs   Lab 04/09/24  0622   HGB 11.0*   INR 1.11       Recent Labs   Lab 04/09/24  0622      K 4.6      CO2 23.0   BUN 22   CREATSERUM 1.02   CA 10.1   *       No results for input(s): \"ALT\", \"AST\", \"ALB\", \"AMYLASE\", \"LIPASE\", \"LDH\" in the last 168 hours.    Invalid input(s):  \"ALPHOS\", \"TBIL\", \"DBIL\", \"TPROT\"    Recent Labs   Lab 04/08/24  1243 04/08/24  1644 04/09/24  0645 04/09/24  1157   PGLU 82 84 166* 96       No results for input(s): \"TROP\" in the last 168 hours.      Meds:      insulin aspart  1-5 Units Subcutaneous TID AC and HS    [START ON 4/10/2024] ceFAZolin  2 g Intravenous On Call to OR    ceFAZolin  1 g Intravenous Q8H      lactated ringers 20 mL/hr at 04/08/24 1353     glucose **OR** glucose **OR** glucose-vitamin C **OR** dextrose **OR** glucose **OR** glucose **OR** glucose-vitamin C, phenazopyridine, acetaminophen, HYDROcodone-acetaminophen, morphINE, diphenhydrAMINE **OR** diphenhydrAMINE, polyethylene glycol (PEG 3350), sennosides, bisacodyl, fleet enema, ondansetron **OR** ondansetron       Assessment/Plan:   68 year old female with PMH including but not limited to DM, HTN, neuropathy who p/t EH for scheduled surgery by Dr. Martinez.   # Right Renal Calculus, Obstructed Right Nephrostomy Tube   -S/P Right Antegrade Nephrostogram, Attempted PCNL, Removal of Right Nephrostomy Tube 4/8  -d/w RODOLFO Rojas-C, s/p IR placement of right NT today followed by right PCNL tomorrow.    -cont plan per .  increase activity as tolerated   -IS 10x/hr for atelectasis; Instructed on use of IS  -PO food/fluid per surgery, bowel regimen as needed  -anselmo/post-op abx per surgery, on ancef  -some burning c ponce, pyridium if ok c       # Acute pain, expected  -IV pain meds as needed, will monitor and encourage transition to PO pain meds as tolerated  -Bowel regimen for associated narcotic constipation       # DM  - not on meds? Follow glc     # HTN  - not on meds? Follow BP     # Tob abuse  -Pt was counseled on smoking cessation especially c wound healing; discussed multiple strategies including nicotine replacement and pharmacologic management, a total of 5 min spent on 4/9/2024  -pt trying to quiting smoking this past year     # DVT proph  -per         Dispo: cont post-op care        Questions/concerns were discussed with patient by bedside. D/w RN     Total Time spent with patient and coordinating care:  55 minutes    Martin Soto MD  [facility] Hospitalist  [phone]  4/9/2024  4:14 PM

## 2024-04-09 NOTE — DISCHARGE INSTRUCTIONS
What to Expect After Surgery  Mild to moderate bleeding in the urine is common after PCNL. Patients often have a mild to moderate amount of pain at the site of the nephrostomy tube which is easily managed with oral pain medications. If you have a ureteral stent in place, you may also experience some burning with urination, urgency, or increased urinary frequency.  Drainage of urine from the incision is typical for several days.    Risks Associated with Procedure  Bleeding  Infection  Need for re-treatment    What to Do After Your Procedure  Drink plenty of fluids, 10-12 (8 oz) glasses of water per day, unless given specific restrictions.  Take your pain medications as prescribed, when needed. These medications may cause constipation.  Avoid straining and constipation. You can prevent constipation by drinking fluids and adding fruit and vegetables to your diet. You may also add stool softners as needed to prevent constipation.  If prescribed additional medications (i.e. Rapaflo, Flomax, or Uroxatral) take as directed. Finish/discard antibiotic prescription given prior to procedure  You may resume any prescription medications, but DO NOT take any aspirin, ibuprofen or blood thinners until approved by your physician.  Ambulate often, but rest when you become tired.    Activity Restrictions  You may resume normal activities as tolerated. Walking and moving about aids in the passage of some stone particles. However, avoid sports or really strenuous exercise until there is no blood in your urine, or when physician approves.  Do not lift more than 15 lbs for 4-6 weeks  You may shower. Do not soak your incision in a hot tub or bath tub until it is healed.  Do not drive for 2 weeks; this should be at least four days after the last dose of pain medication.    When to Call Your Doctor  You cannot pass urine.  Your urine becomes so bloody that you cannot see through it.  Your fever is over 100.5° F (orally) for two readings  taken 4 hours apart.  You have severe burning, pain, and irritation with urination.  Your pain is unrelieved with pain medication.  You have persistent nausea or vomiting.    Follow-Up Care:    Please call to schedule your follow up appointment the day you come home from surgery.    FOLLOW-UP ON MONDAY 4/15/24 FOR NEPHROSTOMY TUBE REMOVAL.    TAKE CEPHALEXIN FOR 2 DAYS THEN RESUME ONE DAY PRIOR TO NEPHROSTOMY TUBE REMOVAL    __________________________________________________________________________    You will be instructed upon discharge when to follow-up with your physician. If you have a stent placed or nephrostomy tube you will be given instructions when it is to be removed. In some cases you will be able to remove the stent yourself at home or you will be required to schedule an in-office procedure in which the stent will be removed. At the time of your follow-up appointment, an x-ray will be performed in order to evaluate the success of the procedure. Please arrive 10-15 minutes early in order to complete the x-ray before seeing your physician.  Note: Some stents have a thread/string that may be seen coming from the urethra. DO NOT pull or dislodge the string. If you notice increased leakage of urine, notify your physician.    Thank you for letting us be involved in your healthcare.

## 2024-04-09 NOTE — PROGRESS NOTES
[facility]  Urology Progress Note    America Scott Patient Status:  Inpatient    1955 MRN [id number]   Location [facility] 3NW-A Attending Jesus Martinez MD   Hosp Day # 1 PCP Agnes Sawyer MD     Subjective:  America Scott is a(n) 68 year old female. S/P right antegrade nephrostogram, attempted PCNL, removal of right NT 24 with Dr. Martinez    Current complaints: Nursing staff at bedside - getting patient cleaned up/changed.  +leakage from NT site.  Patient denies pain.  No nausea, vomiting, fever, or chills.      Objective:  General appearance: alert, appears stated age, and cooperative  Blood pressure 111/54, pulse 79, temperature 98.8 °F (37.1 °C), temperature source Oral, resp. rate 18, height 5' 3\" (1.6 m), weight 135 lb (61.2 kg), SpO2 96%, not currently breastfeeding.  Lungs: non-labored respirations  Abdomen: soft, non-tender  Dressing in place at NT site  : ponce catheter in place (UOP - 1135 mL total yesterday)  Lab Results   Component Value Date    PGLU 84 2024            XR OR - N/C    Result Date: 2024  CONCLUSION:  Fluoroscopic guidance for urologic procedure.   LOCATION:  [place]    Dictated by (CST): Freddy Spencer MD on 2024 at 4:41 PM     Finalized by (CST): Freddy Spencer MD on 2024 at 4:42 PM         Assessment:    RIGHT STAGHORN RENAL CALCULUS  S/P right antegrade nephrostogram, attempted PCNL, removal of right NT 24 with Dr. Martinez  Afebrile,VSS  AM labs pending    Plan:    IR to make another attempt to place a NT  Will need to reschedule the PCNL - will discuss timing with Dr. Martinez.    Above discussed with patient, nurse.    GRISELDA Gates [facility]  Department of Urology  2024  6:33 AM    Addendum:  Spoke with Dr. Martinez.  Will proceed with IR placement of right NT today followed by right PCNL tomorrow.      Left message for Dr. Martinez's surgery scheduler.    NPO after MN  Consent to be signed  Ancef on call to OR    Above  discussed with patient, nurse, Dr. Juan Rojas PA-C

## 2024-04-09 NOTE — PROGRESS NOTES
Hospitalist paged with new consult.     Pt admitted to unit in stable condition. Previous nephrostomy site to right back with gauze and Tegaderm. Dressing completely saturated with serosanguinous drainage. Dressing removed and replaced with gauze and pressure tape. Mcginnis draining urine. Pt denies need for pain medication at this time. IVF infusing. Pt is ambulatory with a cane at home. Pt instructed to only get up with assist from nursing staff. Plan for IR procedure tomorrow discussed with pt. All questions addressed.

## 2024-04-09 NOTE — PROCEDURES
[facility]   part of [facility]  Procedure Note    America Scott Patient Status:  Inpatient    1955 MRN [id number]   Location [facility] 3NW-A Attending Jesus Martinez MD   Hosp Day # 1 PCP Agnes Sawyer MD     Procedure: R nephroureterostomy tube placement    Pre-Procedure Diagnosis:  Staghorn calculi    Post-Procedure Diagnosis: Same    Anesthesia:  Local and Sedation    Findings:  10f 28 cm nephU stent placed from R upper pole to urinary bladder    Specimens: None    Blood Loss:  Minimal    Tourniquet Time: None  Complications:  None  Drains:  None    Secondary Diagnosis:  None    Rober Tim MD  2024

## 2024-04-09 NOTE — H&P
[facility]   part of [facility]   History & Physical    America Scott Patient Status:  Inpatient    1955 MRN [id number]   Location [facility] 3NW-A Attending Jesus Martinez MD   Hosp Day # 1 PCP Agnes Sawyer MD     Admitting Diagnosis:   Renal stones    History of Present Illness:   69 yo F staghorn calc on R w/ need for access for PCNL    History   Past Medical History:  Past Medical History:   Diagnosis Date    Back problem     Calculus of kidney     Diabetes (HCC)     NO CURRENT MEDICATION    Edema     Essential hypertension     High blood pressure     NO CURRENT MEDICATION    High cholesterol     NO CURRENT MEDICATION    Hx of motion sickness     Muscle weakness     USING A CANE/WALKER    Neuropathy     Osteoarthritis     PONV (postoperative nausea and vomiting)     Visual impairment     GLASSES       Past Surgical History:  Past Surgical History:   Procedure Laterality Date    TONSILLECTOMY      TUBAL LIGATION         Social History:  Social History     Tobacco Use    Smoking status: Former     Packs/day: 0.75     Years: 35.00     Additional pack years: 0.00     Total pack years: 26.25     Types: Cigarettes    Smokeless tobacco: Never   Substance Use Topics    Alcohol use: Not Currently        Family History:  History reviewed. No pertinent family history.    Allergies/Medications:   Allergies:  No Known Allergies    Medications:  No current outpatient medications on file.    Physical Exam & Review of Systems:   Physical Exam:    /51 (BP Location: Right arm)   Pulse 80   Temp 98.2 °F (36.8 °C) (Oral)   Resp 20   Ht 63\"   Wt 135 lb   SpO2 98%   BMI 23.91 kg/m²     General: NAD  Neck: No JVD  Lungs: CTA bilat  Heart: RRR, S1, S2  Abdomen: Soft, NT/ND, BS+x4  Extremities: Warm, dry, no LE edema bilat  Pulses: 2+ bilat DP    Results:   Labs:  Recent Labs   Lab 24   HGB 11.0*     Recent Labs   Lab 24   PTP 14.3   INR 1.11     Recent Labs   Lab  04/09/24  0622   *   BUN 22   CREATSERUM 1.02   CA 10.1      K 4.6      CO2 23.0       Assessment/Plan:   Impression: 69 yo F staghorn calculus R kidney    I have discussed with the patient and/or legal representative the potential benefits, risks, and side effects of this procedure, the likelihood of the patient achieving goals; and the potential problems that might occur during recuperation.  I discussed reasonable alternatives to the procedure, including risks, benefits and side effects related to the alternatives, and risks related to not receiving this procedure.      Recommendations: R nephrostomy vs nephroureterostomy tube placement for PCNL    Rober Tim MD  4/9/2024  5:27 PM

## 2024-04-10 ENCOUNTER — APPOINTMENT (OUTPATIENT)
Dept: CT IMAGING | Facility: HOSPITAL | Age: 69
End: 2024-04-10
Attending: UROLOGY
Payer: MEDICARE

## 2024-04-10 ENCOUNTER — ANESTHESIA (OUTPATIENT)
Dept: SURGERY | Facility: HOSPITAL | Age: 69
End: 2024-04-10
Payer: MEDICARE

## 2024-04-10 ENCOUNTER — APPOINTMENT (OUTPATIENT)
Dept: GENERAL RADIOLOGY | Facility: HOSPITAL | Age: 69
End: 2024-04-10
Attending: UROLOGY
Payer: MEDICARE

## 2024-04-10 ENCOUNTER — APPOINTMENT (OUTPATIENT)
Dept: GENERAL RADIOLOGY | Facility: HOSPITAL | Age: 69
End: 2024-04-10
Attending: PHYSICIAN ASSISTANT
Payer: MEDICARE

## 2024-04-10 ENCOUNTER — ANESTHESIA EVENT (OUTPATIENT)
Dept: SURGERY | Facility: HOSPITAL | Age: 69
End: 2024-04-10
Payer: MEDICARE

## 2024-04-10 LAB
ANION GAP SERPL CALC-SCNC: 5 MMOL/L (ref 0–18)
ATRIAL RATE: 69 BPM
BUN BLD-MCNC: 18 MG/DL (ref 9–23)
CALCIUM BLD-MCNC: 10 MG/DL (ref 8.5–10.1)
CHLORIDE SERPL-SCNC: 108 MMOL/L (ref 98–112)
CO2 SERPL-SCNC: 26 MMOL/L (ref 21–32)
CREAT BLD-MCNC: 0.92 MG/DL
D DIMER PPP FEU-MCNC: 1.09 UG/ML FEU (ref ?–0.66)
EGFRCR SERPLBLD CKD-EPI 2021: 69 ML/MIN/1.73M2 (ref 60–?)
ERYTHROCYTE [DISTWIDTH] IN BLOOD BY AUTOMATED COUNT: 14.9 %
GLUCOSE BLD-MCNC: 105 MG/DL (ref 70–99)
GLUCOSE BLD-MCNC: 77 MG/DL (ref 70–99)
GLUCOSE BLD-MCNC: 84 MG/DL (ref 70–99)
GLUCOSE BLD-MCNC: 89 MG/DL (ref 70–99)
GLUCOSE BLD-MCNC: 95 MG/DL (ref 70–99)
HCT VFR BLD AUTO: 33.6 %
HGB BLD-MCNC: 11.2 G/DL
MCH RBC QN AUTO: 27.6 PG (ref 26–34)
MCHC RBC AUTO-ENTMCNC: 33.3 G/DL (ref 31–37)
MCV RBC AUTO: 82.8 FL
OSMOLALITY SERPL CALC.SUM OF ELEC: 290 MOSM/KG (ref 275–295)
P AXIS: 73 DEGREES
P-R INTERVAL: 144 MS
PLATELET # BLD AUTO: 236 10(3)UL (ref 150–450)
POTASSIUM SERPL-SCNC: 3.9 MMOL/L (ref 3.5–5.1)
Q-T INTERVAL: 414 MS
QRS DURATION: 76 MS
QTC CALCULATION (BEZET): 443 MS
R AXIS: 5 DEGREES
RBC # BLD AUTO: 4.06 X10(6)UL
SODIUM SERPL-SCNC: 139 MMOL/L (ref 136–145)
T AXIS: 31 DEGREES
TROPONIN I SERPL HS-MCNC: 4 NG/L
TROPONIN I SERPL HS-MCNC: 4 NG/L
VENTRICULAR RATE: 69 BPM
WBC # BLD AUTO: 6.6 X10(3) UL (ref 4–11)

## 2024-04-10 PROCEDURE — 93005 ELECTROCARDIOGRAM TRACING: CPT

## 2024-04-10 PROCEDURE — 84484 ASSAY OF TROPONIN QUANT: CPT | Performed by: INTERNAL MEDICINE

## 2024-04-10 PROCEDURE — 82962 GLUCOSE BLOOD TEST: CPT

## 2024-04-10 PROCEDURE — 0TC03ZZ EXTIRPATION OF MATTER FROM RIGHT KIDNEY, PERCUTANEOUS APPROACH: ICD-10-PCS | Performed by: UROLOGY

## 2024-04-10 PROCEDURE — 84484 ASSAY OF TROPONIN QUANT: CPT | Performed by: UROLOGY

## 2024-04-10 PROCEDURE — 71275 CT ANGIOGRAPHY CHEST: CPT | Performed by: UROLOGY

## 2024-04-10 PROCEDURE — 82365 CALCULUS SPECTROSCOPY: CPT | Performed by: UROLOGY

## 2024-04-10 PROCEDURE — 71046 X-RAY EXAM CHEST 2 VIEWS: CPT | Performed by: PHYSICIAN ASSISTANT

## 2024-04-10 PROCEDURE — 88300 SURGICAL PATH GROSS: CPT | Performed by: UROLOGY

## 2024-04-10 PROCEDURE — 80048 BASIC METABOLIC PNL TOTAL CA: CPT | Performed by: INTERNAL MEDICINE

## 2024-04-10 PROCEDURE — 0T25X0Z CHANGE DRAINAGE DEVICE IN KIDNEY, EXTERNAL APPROACH: ICD-10-PCS | Performed by: UROLOGY

## 2024-04-10 PROCEDURE — 85379 FIBRIN DEGRADATION QUANT: CPT | Performed by: INTERNAL MEDICINE

## 2024-04-10 PROCEDURE — 93010 ELECTROCARDIOGRAM REPORT: CPT | Performed by: INTERNAL MEDICINE

## 2024-04-10 PROCEDURE — 85027 COMPLETE CBC AUTOMATED: CPT | Performed by: INTERNAL MEDICINE

## 2024-04-10 RX ORDER — HYDROCODONE BITARTRATE AND ACETAMINOPHEN 5; 325 MG/1; MG/1
2 TABLET ORAL ONCE AS NEEDED
Status: DISCONTINUED | OUTPATIENT
Start: 2024-04-10 | End: 2024-04-10 | Stop reason: HOSPADM

## 2024-04-10 RX ORDER — DEXAMETHASONE SODIUM PHOSPHATE 4 MG/ML
VIAL (ML) INJECTION AS NEEDED
Status: DISCONTINUED | OUTPATIENT
Start: 2024-04-10 | End: 2024-04-10 | Stop reason: SURG

## 2024-04-10 RX ORDER — METOCLOPRAMIDE HYDROCHLORIDE 5 MG/ML
10 INJECTION INTRAMUSCULAR; INTRAVENOUS EVERY 8 HOURS PRN
Status: DISCONTINUED | OUTPATIENT
Start: 2024-04-10 | End: 2024-04-10 | Stop reason: HOSPADM

## 2024-04-10 RX ORDER — KETOROLAC TROMETHAMINE 30 MG/ML
INJECTION, SOLUTION INTRAMUSCULAR; INTRAVENOUS AS NEEDED
Status: DISCONTINUED | OUTPATIENT
Start: 2024-04-10 | End: 2024-04-10 | Stop reason: SURG

## 2024-04-10 RX ORDER — HYDROMORPHONE HYDROCHLORIDE 1 MG/ML
0.6 INJECTION, SOLUTION INTRAMUSCULAR; INTRAVENOUS; SUBCUTANEOUS EVERY 5 MIN PRN
Status: DISCONTINUED | OUTPATIENT
Start: 2024-04-10 | End: 2024-04-10 | Stop reason: HOSPADM

## 2024-04-10 RX ORDER — ACETAMINOPHEN 500 MG
1000 TABLET ORAL ONCE AS NEEDED
Status: DISCONTINUED | OUTPATIENT
Start: 2024-04-10 | End: 2024-04-10 | Stop reason: HOSPADM

## 2024-04-10 RX ORDER — HYDROCODONE BITARTRATE AND ACETAMINOPHEN 5; 325 MG/1; MG/1
1 TABLET ORAL ONCE AS NEEDED
Status: DISCONTINUED | OUTPATIENT
Start: 2024-04-10 | End: 2024-04-10 | Stop reason: HOSPADM

## 2024-04-10 RX ORDER — INSULIN ASPART 100 [IU]/ML
INJECTION, SOLUTION INTRAVENOUS; SUBCUTANEOUS ONCE
Status: DISCONTINUED | OUTPATIENT
Start: 2024-04-10 | End: 2024-04-10 | Stop reason: HOSPADM

## 2024-04-10 RX ORDER — SODIUM CHLORIDE, SODIUM LACTATE, POTASSIUM CHLORIDE, CALCIUM CHLORIDE 600; 310; 30; 20 MG/100ML; MG/100ML; MG/100ML; MG/100ML
INJECTION, SOLUTION INTRAVENOUS CONTINUOUS
Status: DISCONTINUED | OUTPATIENT
Start: 2024-04-10 | End: 2024-04-10 | Stop reason: HOSPADM

## 2024-04-10 RX ORDER — HYDROMORPHONE HYDROCHLORIDE 1 MG/ML
0.4 INJECTION, SOLUTION INTRAMUSCULAR; INTRAVENOUS; SUBCUTANEOUS EVERY 5 MIN PRN
Status: DISCONTINUED | OUTPATIENT
Start: 2024-04-10 | End: 2024-04-10 | Stop reason: HOSPADM

## 2024-04-10 RX ORDER — HYDROMORPHONE HYDROCHLORIDE 1 MG/ML
0.2 INJECTION, SOLUTION INTRAMUSCULAR; INTRAVENOUS; SUBCUTANEOUS EVERY 5 MIN PRN
Status: DISCONTINUED | OUTPATIENT
Start: 2024-04-10 | End: 2024-04-10 | Stop reason: HOSPADM

## 2024-04-10 RX ORDER — MIDAZOLAM HYDROCHLORIDE 1 MG/ML
1 INJECTION INTRAMUSCULAR; INTRAVENOUS EVERY 5 MIN PRN
Status: DISCONTINUED | OUTPATIENT
Start: 2024-04-10 | End: 2024-04-10 | Stop reason: HOSPADM

## 2024-04-10 RX ORDER — ACETAMINOPHEN 325 MG/1
TABLET ORAL
Status: COMPLETED
Start: 2024-04-10 | End: 2024-04-10

## 2024-04-10 RX ORDER — LIDOCAINE HYDROCHLORIDE 10 MG/ML
INJECTION, SOLUTION EPIDURAL; INFILTRATION; INTRACAUDAL; PERINEURAL AS NEEDED
Status: DISCONTINUED | OUTPATIENT
Start: 2024-04-10 | End: 2024-04-10 | Stop reason: SURG

## 2024-04-10 RX ORDER — SODIUM CHLORIDE 9 MG/ML
INJECTION, SOLUTION INTRAVENOUS CONTINUOUS PRN
Status: DISCONTINUED | OUTPATIENT
Start: 2024-04-10 | End: 2024-04-10 | Stop reason: SURG

## 2024-04-10 RX ORDER — NALOXONE HYDROCHLORIDE 0.4 MG/ML
0.08 INJECTION, SOLUTION INTRAMUSCULAR; INTRAVENOUS; SUBCUTANEOUS AS NEEDED
Status: DISCONTINUED | OUTPATIENT
Start: 2024-04-10 | End: 2024-04-10 | Stop reason: HOSPADM

## 2024-04-10 RX ORDER — ROCURONIUM BROMIDE 10 MG/ML
INJECTION, SOLUTION INTRAVENOUS AS NEEDED
Status: DISCONTINUED | OUTPATIENT
Start: 2024-04-10 | End: 2024-04-10 | Stop reason: SURG

## 2024-04-10 RX ORDER — BUPIVACAINE HYDROCHLORIDE AND EPINEPHRINE 5; 5 MG/ML; UG/ML
INJECTION, SOLUTION EPIDURAL; INTRACAUDAL; PERINEURAL AS NEEDED
Status: DISCONTINUED | OUTPATIENT
Start: 2024-04-10 | End: 2024-04-10 | Stop reason: HOSPADM

## 2024-04-10 RX ORDER — ONDANSETRON 2 MG/ML
INJECTION INTRAMUSCULAR; INTRAVENOUS AS NEEDED
Status: DISCONTINUED | OUTPATIENT
Start: 2024-04-10 | End: 2024-04-10 | Stop reason: SURG

## 2024-04-10 RX ORDER — ONDANSETRON 2 MG/ML
4 INJECTION INTRAMUSCULAR; INTRAVENOUS EVERY 6 HOURS PRN
Status: DISCONTINUED | OUTPATIENT
Start: 2024-04-10 | End: 2024-04-10 | Stop reason: HOSPADM

## 2024-04-10 RX ORDER — ACETAMINOPHEN 325 MG/1
650 TABLET ORAL ONCE
Status: COMPLETED | OUTPATIENT
Start: 2024-04-10 | End: 2024-04-10

## 2024-04-10 RX ORDER — MIDAZOLAM HYDROCHLORIDE 1 MG/ML
INJECTION INTRAMUSCULAR; INTRAVENOUS AS NEEDED
Status: DISCONTINUED | OUTPATIENT
Start: 2024-04-10 | End: 2024-04-10 | Stop reason: SURG

## 2024-04-10 RX ORDER — ONDANSETRON 2 MG/ML
INJECTION INTRAMUSCULAR; INTRAVENOUS
Status: COMPLETED
Start: 2024-04-10 | End: 2024-04-10

## 2024-04-10 RX ADMIN — ROCURONIUM BROMIDE 20 MG: 10 INJECTION, SOLUTION INTRAVENOUS at 16:43:00

## 2024-04-10 RX ADMIN — SODIUM CHLORIDE: 9 INJECTION, SOLUTION INTRAVENOUS at 14:30:00

## 2024-04-10 RX ADMIN — MIDAZOLAM HYDROCHLORIDE 2 MG: 1 INJECTION INTRAMUSCULAR; INTRAVENOUS at 14:26:00

## 2024-04-10 RX ADMIN — CEFAZOLIN SODIUM/WATER 2 G: 2 G/20 ML SYRINGE (ML) INTRAVENOUS at 14:25:00

## 2024-04-10 RX ADMIN — SODIUM CHLORIDE: 9 INJECTION, SOLUTION INTRAVENOUS at 17:46:00

## 2024-04-10 RX ADMIN — LIDOCAINE HYDROCHLORIDE 25 MG: 10 INJECTION, SOLUTION EPIDURAL; INFILTRATION; INTRACAUDAL; PERINEURAL at 14:30:00

## 2024-04-10 RX ADMIN — ONDANSETRON 4 MG: 2 INJECTION INTRAMUSCULAR; INTRAVENOUS at 14:35:00

## 2024-04-10 RX ADMIN — ROCURONIUM BROMIDE 10 MG: 10 INJECTION, SOLUTION INTRAVENOUS at 14:31:00

## 2024-04-10 RX ADMIN — DEXAMETHASONE SODIUM PHOSPHATE 4 MG: 4 MG/ML VIAL (ML) INJECTION at 14:35:00

## 2024-04-10 RX ADMIN — KETOROLAC TROMETHAMINE 15 MG: 30 INJECTION, SOLUTION INTRAMUSCULAR; INTRAVENOUS at 17:27:00

## 2024-04-10 RX ADMIN — ROCURONIUM BROMIDE 30 MG: 10 INJECTION, SOLUTION INTRAVENOUS at 14:35:00

## 2024-04-10 NOTE — PROGRESS NOTES
Alert and oriented X 4. RA. VSS. QID accucheck. NPO. Due to procedure. Glasses and dentures in room. Mcginnis CDI. Nephrostomy tube on right side. Up with walker and a cane. IVF infusing. Abdomen is soft and non distended. Updated on plan of care. Call light in reach.

## 2024-04-10 NOTE — ANESTHESIA PROCEDURE NOTES
Airway  Date/Time: 4/10/2024 2:34 PM  Urgency: elective      General Information and Staff    Patient location during procedure: OR  Anesthesiologist: Albert Kaplan MD  Performed: anesthesiologist   Performed by: Albert Kaplan MD  Authorized by: Albert Kaplan MD      Indications and Patient Condition  Indications for airway management: anesthesia  Sedation level: deep  Preoxygenated: yes  Patient position: sniffing  Mask difficulty assessment: 1 - vent by mask    Final Airway Details  Final airway type: endotracheal airway      Successful airway: ETT  Cuffed: yes   Successful intubation technique: direct laryngoscopy  Endotracheal tube insertion site: oral  Blade: Orestes  Blade size: #4  ETT size (mm): 7.0    Placement verified by: capnometry   Measured from: lips  Number of attempts at approach: 1

## 2024-04-10 NOTE — ANESTHESIA POSTPROCEDURE EVALUATION
[facility]    America Scott Patient Status:  Inpatient   Age/Gender 68 year old female MRN [id number]   Location [facility] POST ANESTHESIA CARE UNIT Attending Jesus Martinez MD   Hosp Day # 2 PCP Agnes Sawyer MD       Anesthesia Post-op Note    RIGHT PERCUTANEOUS NEPHROLITHOTRIPSY    Procedure Summary       Date: 04/10/24 Room / Location:  MAIN OR 14 / [facility] MAIN OR    Anesthesia Start: 1425 Anesthesia Stop:     Procedure: RIGHT PERCUTANEOUS NEPHROLITHOTRIPSY (Right: Flank) Diagnosis: (STAGHORN RENAL CALCULUS)    Surgeons: Jesus Martinez MD Anesthesiologist: Albert Kaplan MD    Anesthesia Type: general ASA Status: 2            Anesthesia Type: general    Vitals Value Taken Time   /57 04/10/24 1746   Temp 98.8 04/10/24 1746   Pulse 91 04/10/24 1746   Resp 18 04/10/24 1746   SpO2 97 04/10/24 1746       Patient Location: PACU    Anesthesia Type: general    Airway Patency: patent    Postop Pain Control: adequate    Mental Status: mildly sedated but able to meaningfully participate in the post-anesthesia evaluation    Nausea/Vomiting: none    Cardiopulmonary/Hydration status: stable euvolemic    Complications: no apparent anesthesia related complications    Postop vital signs: stable    Dental Exam: Unchanged from Preop    Patient to be discharged from PACU when criteria met.

## 2024-04-10 NOTE — ANESTHESIA PREPROCEDURE EVALUATION
PRE-OP EVALUATION    Patient Name: America Scott    Admit Diagnosis: STAGHORN RENAL CALCULUS  Staghorn renal calculus    Pre-op Diagnosis: STAGHORN RENAL CALCULUS    RIGHT PERCUTANEOUS NEPHROLITHOTOMY    Anesthesia Procedure: RIGHT PERCUTANEOUS NEPHROLITHOTOMY (Right)    Surgeons and Role:     * Jesus Martinez MD - Primary    Pre-op vitals reviewed.  Temp: 98.4 °F (36.9 °C)  Pulse: 64  Resp: 18  BP: 128/58  SpO2: 96 %  Body mass index is 23.91 kg/m².    Current medications reviewed.  Hospital Medications:   glucose (Dex4) 15 GM/59ML oral liquid 15 g  15 g Oral Q15 Min PRN    Or    glucose (Glutose) 40% oral gel 15 g  15 g Oral Q15 Min PRN    Or    glucose-vitamin C (Dex-4) chewable tab 4 tablet  4 tablet Oral Q15 Min PRN    Or    dextrose 50% injection 50 mL  50 mL Intravenous Q15 Min PRN    Or    glucose (Dex4) 15 GM/59ML oral liquid 30 g  30 g Oral Q15 Min PRN    Or    glucose (Glutose) 40% oral gel 30 g  30 g Oral Q15 Min PRN    Or    glucose-vitamin C (Dex-4) chewable tab 8 tablet  8 tablet Oral Q15 Min PRN    insulin aspart (NovoLOG) 100 Units/mL FlexPen 1-5 Units  1-5 Units Subcutaneous TID AC and HS    ceFAZolin (Ancef) 2 g in 20mL IV syringe premix  2 g Intravenous On Call to OR    [COMPLETED] fentaNYL (Sublimaze) 50 mcg/mL injection        [COMPLETED] midazolam (Versed) 2 MG/2ML injection        [COMPLETED] lidocaine (Xylocaine) 1 % injection        [COMPLETED] levoFLOXacin in dextrose 5% (Levaquin) 500 mg/100mL IVPB premix        [COMPLETED] fentaNYL (Sublimaze) 50 mcg/mL injection        [COMPLETED] midazolam (Versed) 2 MG/2ML injection        [COMPLETED] iohexol (OMNIPAQUE) 350 MG/ML injection 20 mL  20 mL Intravenous ONCE PRN    phenazopyridine (Pyridium) tab 200 mg  200 mg Oral TID PRN    [COMPLETED] ceFAZolin (Ancef) 2 g in 20mL IV syringe premix  2 g Intravenous Once    lactated ringers infusion   Intravenous Continuous    acetaminophen (Tylenol Extra Strength) tab 500 mg  500 mg Oral Q6H PRN     HYDROcodone-acetaminophen (Norco)  MG per tab 1 tablet  1 tablet Oral Q4H PRN    morphINE PF 4 MG/ML injection 2 mg  2 mg Intravenous Q1H PRN    diphenhydrAMINE (Benadryl) cap/tab 25 mg  25 mg Oral Nightly PRN    Or    diphenhydrAMINE (Benadryl) 50 mg/mL  injection 12.5 mg  12.5 mg Intravenous Nightly PRN    polyethylene glycol (PEG 3350) (Miralax) 17 g oral packet 17 g  17 g Oral Daily PRN    sennosides (Senokot) tab 17.2 mg  17.2 mg Oral Nightly PRN    bisacodyl (Dulcolax) 10 MG rectal suppository 10 mg  10 mg Rectal Daily PRN    fleet enema (Fleet) 7-19 GM/118ML rectal enema 133 mL  1 enema Rectal Once PRN    ondansetron (Zofran) 4 MG/2ML injection 4 mg  4 mg Intravenous Q6H PRN    Or    ondansetron (Zofran) tab 4 mg  4 mg Oral Q6H PRN    [COMPLETED] lidocaine (Xylocaine) 1 % injection        [COMPLETED] ceFAZolin (Ancef) 1 g in dextrose 5% 100mL IVPB-ADD  1 g Intravenous Q8H    [COMPLETED] fentaNYL (Sublimaze) 50 mcg/mL injection           Outpatient Medications:     Medications Prior to Admission   Medication Sig Dispense Refill Last Dose    acetaminophen 500 MG Oral Tab Take 1 tablet (500 mg total) by mouth every 6 (six) hours as needed for Pain.   Past Week    CEPHALEXIN 500 MG Oral Cap Take 1 capsule (500 mg total) by mouth 4 (four) times daily. Take 4 tabs by mouth daily for 3 days and stop; Then take 2 tabs daily starting 2 days prior to stone surgery and continue for 5 days 22 capsule 0 4/7/2024 at 1900       Allergies: Patient has no known allergies.      Anesthesia Evaluation    Patient summary reviewed.    Anesthetic Complications           GI/Hepatic/Renal    Negative GI/hepatic/renal ROS.                             Cardiovascular                  (+) hypertension                                     Endo/Other      (+) diabetes                            Pulmonary    Negative pulmonary ROS.                       Neuro/Psych    Negative neuro/psych ROS.                                  Past  Surgical History:   Procedure Laterality Date    Tonsillectomy      Tubal ligation       Social History     Socioeconomic History    Marital status: Single   Tobacco Use    Smoking status: Former     Current packs/day: 0.75     Average packs/day: 0.8 packs/day for 35.0 years (26.3 ttl pk-yrs)     Types: Cigarettes    Smokeless tobacco: Never   Vaping Use    Vaping status: Never Used   Substance and Sexual Activity    Alcohol use: Not Currently    Drug use: Not Currently     History   Drug Use Unknown     Available pre-op labs reviewed.  Lab Results   Component Value Date    WBC 8.3 03/24/2024    RBC 3.63 (L) 03/24/2024    HGB 11.0 (L) 04/09/2024    HCT 31.0 (L) 03/24/2024    MCV 85.4 03/24/2024    MCH 26.7 03/24/2024    MCHC 31.3 03/24/2024    RDW 14.5 03/24/2024    .0 03/24/2024     Lab Results   Component Value Date     04/09/2024    K 4.6 04/09/2024     04/09/2024    CO2 23.0 04/09/2024    BUN 22 04/09/2024    CREATSERUM 1.02 04/09/2024     (H) 04/09/2024    CA 10.1 04/09/2024     Lab Results   Component Value Date    INR 1.11 04/09/2024          ASA: 2   Plan: general                             Present on Admission:  **None**

## 2024-04-10 NOTE — PROGRESS NOTES
[facility] Hospitalist Progress Note     PCP: Agnes Sawyer MD    Chief Complaint: follow-up   Follow up for: There were no encounter diagnoses.    Overnight/Interim Events:      SUBJECTIVE:  Pt c/o some cp and pain c inspiration earlier in am. Little better now. Seems moreso soreness from tube site.       OBJECTIVE:  Temp:  [97.6 °F (36.4 °C)-98.6 °F (37 °C)] 98.6 °F (37 °C)  Pulse:  [63-80] 74  Resp:  [18-20] 18  BP: (111-131)/(50-61) 111/50  SpO2:  [96 %-100 %] 100 %    Intake/Output:    Intake/Output Summary (Last 24 hours) at 4/10/2024 1305  Last data filed at 4/10/2024 1224  Gross per 24 hour   Intake 1222 ml   Output 1480 ml   Net -258 ml       Last 3 Weights   04/08/24 1829 135 lb (61.2 kg)   04/08/24 1232 135 lb (61.2 kg)   03/27/24 1322 140 lb (63.5 kg)   03/18/24 0116 141 lb 6.4 oz (64.1 kg)   03/17/24 2109 149 lb 14.6 oz (68 kg)   08/25/21 1436 145 lb (65.8 kg)       Exam    General: Alert, no distress, appears stated age.     Head:  Normocephalic, without obvious abnormality, atraumatic.   Eyes:  Sclera anicteric, EOMs intact.    Nose: Nares normal,  Mucosa normal    Throat: Lips normal   Neck: Supple, symmetrical, trachea midline   Lungs:   Clear to auscultation bilaterally. Normal effort   Chest wall:  No tenderness or deformity   Heart:  Regular rate and rhythm, S1, S2 normal, no murmur, rub or gallop appreciated   Abdomen:   Soft, NT/ND, Bowel sounds normal. No masses,  No organomegaly. NT in place    Extremities: Extremities normal, atraumatic, no cyanosis or LE edema.   Skin: Skin color, texture, turgor normal. No rashes or lesions.    Neurologic: Moving all extremities spontaneously, no focal deficit appreciated      Data Review:       Labs:     Recent Labs   Lab 04/09/24  0622   HGB 11.0*   INR 1.11       Recent Labs   Lab 04/09/24  0622      K 4.6      CO2 23.0   BUN 22   CREATSERUM 1.02   CA 10.1   *       No results for input(s): \"ALT\", \"AST\", \"ALB\", \"AMYLASE\", \"LIPASE\", \"LDH\"  in the last 168 hours.    Invalid input(s): \"ALPHOS\", \"TBIL\", \"DBIL\", \"TPROT\"    Recent Labs   Lab 04/09/24  2126 04/09/24  2148 04/09/24  2356 04/10/24  0612 04/10/24  1220   PGLU 78 84 89 84 77       No results for input(s): \"TROP\" in the last 168 hours.      Meds:      insulin aspart  1-5 Units Subcutaneous TID AC and HS    ceFAZolin  2 g Intravenous On Call to OR      lactated ringers 20 mL/hr at 04/10/24 0154       glucose **OR** glucose **OR** glucose-vitamin C **OR** dextrose **OR** glucose **OR** glucose **OR** glucose-vitamin C    phenazopyridine    acetaminophen    HYDROcodone-acetaminophen    morphINE    diphenhydrAMINE **OR** diphenhydrAMINE    polyethylene glycol (PEG 3350)    sennosides    bisacodyl    fleet enema    ondansetron **OR** ondansetron       Assessment/Plan:   68 year old female with PMH including but not limited to DM, HTN, neuropathy who p/t [facility] for scheduled surgery by Dr. Martinez.   # Right Renal Calculus, Obstructed Right Nephrostomy Tube   -S/P Right Antegrade Nephrostogram, Attempted PCNL, Removal of Right Nephrostomy Tube 4/8  -s/p IR placement of right NT 4/9  -Plan right PCNL today if CT neg and able.    -cont plan per .  increase activity as tolerated   -IS 10x/hr for atelectasis; Instructed on use of IS  -PO food/fluid per surgery, bowel regimen as needed  -anselmo/post-op abx per surgery, on ancef  -some burning c ponce, pyridium if ok c     # Acute pain, expected  -IV pain meds as needed, will monitor and encourage transition to PO pain meds as tolerated  -Bowel regimen for associated narcotic constipation    # CP, some inspiration pain  - more likely soreness from procedure and tube site  - denies cardiac/pulm hx  - trop neg, repeat; EKG ok, NSR  - given sxs, will check CTA for completeness although lower suspicion; dimer was 1.09-->CTA neg   - BMP/CBC ok    - CXR noted     # DM  - not on meds? Follow glc  - hold insulin for now as BG 70-80     # HTN  - not on meds? Follow BP      # Tob abuse  -Pt was counseled on smoking cessation especially c wound healing; discussed multiple strategies including nicotine replacement and pharmacologic management, a total of 5 min spent on 4/9/2024  -pt trying to quiting smoking this past year     # DVT proph  -per         Dispo: cont post-op care       Questions/concerns were discussed with patient by bedside. D/w RN and Dr. Martinez and Crystal PA-C. Total Time spent with patient and coordinating care:  75 minutes [phone] including acute critical care time c cp/sob.     Martin Soto MD  [facility] Hospitalist  [phone]  4/9/2024  4:14 PM

## 2024-04-10 NOTE — PROGRESS NOTES
[facility]  Urology Progress Note    America Scott Patient Status:  Inpatient    1955 MRN [id number]   Location [facility] 3NW-A Attending Jesus Martinez MD   Hosp Day # 2 PCP Agnes Sawyer MD     Subjective:  America Scott is a(n) 68 year old female.  S/P right antegrade nephrostogram, attempted PCNL, removal of right NT 24 with Dr. Martinez     S/P right nephroureterostomy tube 24    Current complaints: Patient reports pain at NT site and chest pain with inspiration since yesterday.  No SOB.    Unsure about stone procedure today due to pain.      Objective:  General appearance: alert, appears stated age, and cooperative  Blood pressure 128/51, pulse 70, temperature 98.6 °F (37 °C), temperature source Oral, resp. rate 18, height 5' 3\" (1.6 m), weight 135 lb (61.2 kg), SpO2 100%, not currently breastfeeding.  Lungs: non-labored respirations  Abdomen:soft, non-tender  Right NT in place (UOP - 330 mL/24 hours)  : ponce catheter in place (UOP - 900 mL/24 hours)  Lab Results   Component Value Date    PGLU 84 04/10/2024            IR NEPHROSTOMY TUBE    Result Date: 2024  CONCLUSION:  Technically successful right nephroureterostomy tube placement.   LOCATION:  [facility]    Dictated by (CST): Rober Tim MD on 2024 at 5:14 PM     Finalized by (CST): Rober Tim MD on 2024 at 5:18 PM       IR NEPHROSTOMY TUBE    Result Date: 2024  CONCLUSION:  See above.   LOCATION:  [facility]    Dictated by (CST): Rober Tim MD on 2024 at 8:09 AM     Finalized by (CST): Rober Tim MD on 2024 at 8:17 AM       XR OR - N/C    Result Date: 2024  CONCLUSION:  Fluoroscopic guidance for urologic procedure.   LOCATION:  [facility]    Dictated by (CST): Freddy Spencer MD on 2024 at 4:41 PM     Finalized by (CST): Freddy Spencer MD on 2024 at 4:42 PM         Assessment:    RIGHT STAGHORN RENAL CALCULUS  S/P right antegrade nephrostogram, attempted PCNL, removal of right NT 24 with  Dr. Martinez  S/P right nephroureterostomy tube 4/9/24  Afebrile,VSS  Hgb 11  Serum creat 1.02    Plan:    CPM with ponce, NT   Check CXR, troponin, EKG  Nurse to notify hospitalist now  Will need to see if able to proceed with right PCNL today    Above discussed with patient, nurse, Dr. Juan Rojas PA-C  [facility]  Department of Urology  4/10/2024  6:50 AM    Addendum:  Troponin 4    EKG 4/10/24: prelim Normal sinus rhythm, Normal ECG     CXR 4/10/24:    Impression   CONCLUSION:  Blunting left costophrenic angle and dependent density left lower lobe are favored to represent small effusion and atelectasis.  Pneumonia would not be excluded.     Hospitalist to see patient regarding medical clearance.     Above discussed with Dr. Martinez.      DEEDEE Genao  Urology

## 2024-04-10 NOTE — OPERATIVE REPORT
Operative Note  [facility]    America Scott Patient Status:  Inpatient    1955 MRN [id number]   Location [facility] POST ANESTHESIA CARE UNIT Attending Jesus Martinez MD   Hosp Day # 2 PCP Agnes Sawyer MD     Date of procedure: 4/10/2024    Pre-Op Diagnosis:  Renal Calculus    Procedure: Right Percutaneous Nephrolithotripsy, And Nephrostomy Tube Change    Post-Op Diagnosis: Same    Surgeon: Jesus Martinez M.D.    Anesthesia:  General    Indications:  Large, 6 cm right staghorn renal calculus.  Obstructed right nephrostomy tube.  Could not gain access to the kidney 2 days ago and a new NT tuber was placed yesterday.      Findings: Large, right staghorn renal calculus.  Looks to haveen completely removed. KUB tomorrow. Home tomorrow with NT in plCe and Follow up Monday for NT removal. 2 days of cephalexin at discharge and then resume it day prior to tube removal.    Specimens: Calculus Fragments    Blood Loss:  75 ml    Complications:  None    Drains:  Nephrostomy tube    Technique:                               A general anesthesia was induced.  Preoperative  antibiotics were administered.  A Mcginnis catheter was placed. The patient was prepped and draped in  the prone position.  The patient was extensively padded with chest rolls, kneepads et cetera. The arms round arm boards extended towards the head. Sequential compression devices were  in place on the lower legs.  A time-out was reviewed.     Contrast was injected through the nephrostomy tract, demonstrating the stones as described above. A guidewire was passed through the nephrostomy tube, down the ureter and used as a safety wire. The nephrostomy tube was removed and then a second guidewire was passed through a 10 English dual-lumen catheter. The skin was incised for about 1.5 cm and the fascia was pierced with the knife. The NephroMax dilating balloon was passed over the guidewire under fluoroscopic guidance. The nephrostomy tract was then  dilated and a 28 Pashto sheath was passed over the balloon. The dilating balloon was removed. The nephroscope was advanced and the stones were encountered. The stones were fragmented with the lithoclast pneumatic lithotripter device using the pneumatic and ultrasonic functions. There were stones in the calycies and these were fragmented with the laser, or extracted intact. The fragments were uric irrigated and aspirated out during the procedure. A guidewire was passed down the ureter again and then a  14 fr ponce was passed over the guidewire and 2cc water in balloon for a nephrostomy tube The curl in the nephrostomy tube was secured. Contrast was instilled through the nephrostomy tube and there was no extravasation from the renal pelvis. The skin was closed with a single 4-0 Vicryl subcuticular suture and then the nephrostomy tube was sutured to the skin. A dressing was applied. The patient tolerated the procedure well, was extubated and taken to recovery area in good condition    Jesus Martinez MD  4/10/2024

## 2024-04-10 NOTE — PROGRESS NOTES
0651: Patient told nurse she has been having chest pain with inspirations since yesterday after her procedure. MD notified, EKG and stat troponin ordered.

## 2024-04-10 NOTE — PROGRESS NOTES
Alert and oriented x 4, saturating on room air, denies chest pain and shortness of breath. Tolerating general diet, NPO at 0000. Abdomen soft, nondistended. Denies nausea. Right nephrostomy tube intact, small leakage of clear fluid at site. Pain in flank 8/10, managed with PRNs per MAR. Ambulates with walker and 1x assist. IV fluids and antibiotics infusing as ordered. Plan of care discussed with patient, all questions addressed. Call light and personal items within reach.

## 2024-04-11 ENCOUNTER — APPOINTMENT (OUTPATIENT)
Dept: GENERAL RADIOLOGY | Facility: HOSPITAL | Age: 69
End: 2024-04-11
Attending: PHYSICIAN ASSISTANT
Payer: MEDICARE

## 2024-04-11 VITALS
TEMPERATURE: 99 F | SYSTOLIC BLOOD PRESSURE: 110 MMHG | HEART RATE: 89 BPM | BODY MASS INDEX: 23.92 KG/M2 | DIASTOLIC BLOOD PRESSURE: 48 MMHG | RESPIRATION RATE: 18 BRPM | OXYGEN SATURATION: 95 % | HEIGHT: 63 IN | WEIGHT: 135 LBS

## 2024-04-11 LAB
ANION GAP SERPL CALC-SCNC: 2 MMOL/L (ref 0–18)
BUN BLD-MCNC: 21 MG/DL (ref 9–23)
CALCIUM BLD-MCNC: 9.5 MG/DL (ref 8.5–10.1)
CHLORIDE SERPL-SCNC: 112 MMOL/L (ref 98–112)
CO2 SERPL-SCNC: 24 MMOL/L (ref 21–32)
CREAT BLD-MCNC: 1.28 MG/DL
EGFRCR SERPLBLD CKD-EPI 2021: 46 ML/MIN/1.73M2 (ref 60–?)
GLUCOSE BLD-MCNC: 143 MG/DL (ref 70–99)
GLUCOSE BLD-MCNC: 152 MG/DL (ref 70–99)
GLUCOSE BLD-MCNC: 164 MG/DL (ref 70–99)
HGB BLD-MCNC: 10.1 G/DL
OSMOLALITY SERPL CALC.SUM OF ELEC: 291 MOSM/KG (ref 275–295)
POTASSIUM SERPL-SCNC: 4.5 MMOL/L (ref 3.5–5.1)
SODIUM SERPL-SCNC: 138 MMOL/L (ref 136–145)

## 2024-04-11 PROCEDURE — 82962 GLUCOSE BLOOD TEST: CPT

## 2024-04-11 PROCEDURE — 85018 HEMOGLOBIN: CPT | Performed by: UROLOGY

## 2024-04-11 PROCEDURE — 80048 BASIC METABOLIC PNL TOTAL CA: CPT | Performed by: UROLOGY

## 2024-04-11 PROCEDURE — 74018 RADEX ABDOMEN 1 VIEW: CPT | Performed by: PHYSICIAN ASSISTANT

## 2024-04-11 RX ORDER — CEPHALEXIN 500 MG/1
500 CAPSULE ORAL 3 TIMES DAILY
Qty: 15 CAPSULE | Refills: 0 | Status: SHIPPED | OUTPATIENT
Start: 2024-04-11 | End: 2024-04-16

## 2024-04-11 NOTE — PROGRESS NOTES
Patient received form PACU.  Oriented to room.   Safety precautions initiated.   Bed in low position.  Call light in reach.

## 2024-04-11 NOTE — CM/SW NOTE
04/11/24 1000   CM/SW Referral Data   Referral Source Social Work (self-referral)   Reason for Referral Discharge planning   Informant Clinical Staff Member;EMR   Medical Hx   Does patient have an established PCP? Yes   Patient Status Prior to Admission   Services in place prior to admission Home Health Care   Home Health Provider Info [facility]   Discharge Needs   Anticipated D/C needs Home health care     Pt discussed in rounds with RN, anticipating dc today. Pt is current with [facility]. Referral in Aidin, AJ orders entered for RN/PT. Will send AVS once available.    Addendum 1350:  Pt medically cleared for dc, requesting transport home. Discussed [facility] costs, pt agreeable. [facility] for 3:30 pm, PCS done.    [facility] [phone]     SW/ to remain available for dc planning, and/or additional need for support.    Jah Sanchez, \[facility]\""  Discharge Planner  [id number]

## 2024-04-11 NOTE — PROGRESS NOTES
[facility]  Urology Progress Note    America Scott Patient Status:  Inpatient    1955 MRN [id number]   Location [facility] 3NW-A Attending Jesus Martinez MD   Hosp Day # 3 PCP Agnes Sawyer MD     Subjective:  America Scott is a(n) 68 year old female. S/P right percutaneous nephrolithotripsy and NT change 4/10/24 with Dr. Martinez    Current complaints: Minimal pain. No nausea, vomiting, fever, or chills.     Has not voided this AM  Bladder scan 233 mL    Objective:  General appearance: alert, appears stated age, and cooperative  Blood pressure 106/46, pulse 79, temperature 98.7 °F (37.1 °C), temperature source Oral, resp. rate 16, height 5' 3\" (1.6 m), weight 135 lb (61.2 kg), SpO2 99%, not currently breastfeeding.  Lungs: non-labored respirations   Abdomen: soft,non-tender  Right NT in place draining reddish urine.    Lab Results   Component Value Date    WBC 6.6 04/10/2024    HGB 11.2 04/10/2024    HCT 33.6 04/10/2024    .0 04/10/2024    CREATSERUM 0.92 04/10/2024    BUN 18 04/10/2024     04/10/2024    K 3.9 04/10/2024     04/10/2024    CO2 26.0 04/10/2024    GLU 95 04/10/2024    CA 10.0 04/10/2024    DDIMER 1.09 04/10/2024    PGLU 105 04/10/2024       XR OR - N/C    Result Date: 4/10/2024  CONCLUSION:  See above.   LOCATION:  [id number]    Dictated by (CST): Rober Tim MD on 4/10/2024 at 5:53 PM     Finalized by (CST): Rober Tim MD on 4/10/2024 at 5:54 PM       CT ANGIOGRAPHY, CHEST (CPT=71275)    Result Date: 4/10/2024  CONCLUSION:  1. There is no pulmonary embolism. 2. There is no acute abnormality detected in the chest. 3. Moderate aortic atherosclerosis is noted with irregular ulcerated plaque in descending thoracic aorta.  There is no dissection or saccular aneurysm.  There is mild ectasia of the proximal descending thoracic aorta measuring up to 2.5 cm. 4. There is dependent atelectasis in the lower lobes.    LOCATION:  [facility]   Dictated by (CST): Anil Hood MD  on 4/10/2024 at 2:28 PM     Finalized by (CST): Anil Hood MD on 4/10/2024 at 2:31 PM       XR CHEST PA + LAT CHEST (CPT=71046)    Result Date: 4/10/2024  CONCLUSION:  Blunting left costophrenic angle and dependent density left lower lobe are favored to represent small effusion and atelectasis.  Pneumonia would not be excluded.   LOCATION:  [facility]   Dictated by (CST): Anil Hood MD on 4/10/2024 at 10:31 AM     Finalized by (CST): Anil Hood MD on 4/10/2024 at 10:41 AM       IR NEPHROSTOMY TUBE    Result Date: 4/9/2024  CONCLUSION:  Technically successful right nephroureterostomy tube placement.   LOCATION:  [facility]    Dictated by (CST): Rober Tim MD on 4/09/2024 at 5:14 PM     Finalized by (CST): Rober Tim MD on 4/09/2024 at 5:18 PM         Assessment:    RENAL CALCULUS  S/P right percutaneous nephrolithotripsy and NT change 4/10/24 with Dr. Juan Govea, VSS  AM labs pending    Plan:    Check bladder scan now, nurse to help patient up to the bathroom  KUB today  Cephalexin x 2 days then resume day prior to NT removal  Patient to f/u on Monday for NT removal - TE sent through [facility] system to help arrange appt.      Anticipate discharge today pending no change in clinical course and approval from hospitalist.      Above discussed with patient, nurse  Will update Dr. Martinez.    Desi Rojas PA-C  [facility]  Department of Urology  4/11/2024  5:28 AM    Addendum:  Recent Labs   Lab 04/09/24  0622 04/10/24  1316 04/11/24  0524   RBC  --  4.06  --    HGB 11.0* 11.2* 10.1*   HCT  --  33.6*  --    MCV  --  82.8  --    MCH  --  27.6  --    MCHC  --  33.3  --    RDW  --  14.9  --    WBC  --  6.6  --    PLT  --  236.0  --      Recent Labs   Lab 04/09/24  0622 04/10/24  1316 04/11/24  0524   * 95 143*   BUN 22 18 21   CREATSERUM 1.02 0.92 1.28*   EGFRCR 61 69 46*   CA 10.1 10.0 9.5    139 138   K 4.6 3.9 4.5    108 112   CO2 23.0 26.0 24.0     Hgb 11 > 11.2 > 10.1  Serum creat 0.92 >  1.28    Lab results discussed with Dr. Martinez.    GRISELDA Genao  Urology

## 2024-04-11 NOTE — PROGRESS NOTES
[facility] Hospitalist Progress Note     PCP: Agnes Sawyer MD    Chief Complaint: follow-up   Follow up for: There were no encounter diagnoses.    Overnight/Interim Events:      SUBJECTIVE:  No cp/sob. Little pain. Was up in chair earlier for 1.5 hrs. +u/o, bloody. Discussed dc plan.       OBJECTIVE:  Temp:  [98 °F (36.7 °C)-99.4 °F (37.4 °C)] 98.8 °F (37.1 °C)  Pulse:  [78-93] 89  Resp:  [11-18] 18  BP: (103-155)/(46-95) 110/48  SpO2:  [95 %-99 %] 95 %    Intake/Output:    Intake/Output Summary (Last 24 hours) at 4/11/2024 1322  Last data filed at 4/11/2024 1247  Gross per 24 hour   Intake 2423 ml   Output 840 ml   Net 1583 ml       Last 3 Weights   04/08/24 1829 135 lb (61.2 kg)   04/08/24 1232 135 lb (61.2 kg)   03/27/24 1322 140 lb (63.5 kg)   03/18/24 0116 141 lb 6.4 oz (64.1 kg)   03/17/24 2109 149 lb 14.6 oz (68 kg)   08/25/21 1436 145 lb (65.8 kg)       Exam    General: Alert, no distress, appears stated age.     Head:  Normocephalic, without obvious abnormality, atraumatic.   Eyes:  Sclera anicteric, EOMs intact.    Nose: Nares normal,  Mucosa normal    Throat: Lips normal   Neck: Supple, symmetrical, trachea midline   Lungs:   Clear to auscultation bilaterally. Normal effort   Chest wall:  No tenderness or deformity   Heart:  Regular rate and rhythm, S1, S2 normal, no murmur, rub or gallop appreciated   Abdomen:   Soft, NT/ND, Bowel sounds normal. No masses,  No organomegaly. NT in place    Extremities: Extremities normal, atraumatic, no cyanosis or LE edema.   Skin: Skin color, texture, turgor normal. No rashes or lesions.    Neurologic: Moving all extremities spontaneously, no focal deficit appreciated      Data Review:       Labs:     Recent Labs   Lab 04/09/24  0622 04/10/24  1316 04/11/24  0524   WBC  --  6.6  --    HGB 11.0* 11.2* 10.1*   MCV  --  82.8  --    PLT  --  236.0  --    INR 1.11  --   --        Recent Labs   Lab 04/09/24  0622 04/10/24  1316 04/11/24  0524    139 138   K 4.6 3.9 4.5   CL  109 108 112   CO2 23.0 26.0 24.0   BUN 22 18 21   CREATSERUM 1.02 0.92 1.28*   CA 10.1 10.0 9.5   * 95 143*       No results for input(s): \"ALT\", \"AST\", \"ALB\", \"AMYLASE\", \"LIPASE\", \"LDH\" in the last 168 hours.    Invalid input(s): \"ALPHOS\", \"TBIL\", \"DBIL\", \"TPROT\"    Recent Labs   Lab 04/10/24  0612 04/10/24  1220 04/10/24  1747 04/11/24  0542 04/11/24  1245   PGLU 84 77 105* 164* 152*       No results for input(s): \"TROP\" in the last 168 hours.      Meds:      insulin aspart  1-5 Units Subcutaneous TID AC and HS      lactated ringers 20 mL/hr at 04/10/24 0154       glucose **OR** glucose **OR** glucose-vitamin C **OR** dextrose **OR** glucose **OR** glucose **OR** glucose-vitamin C    phenazopyridine    acetaminophen    HYDROcodone-acetaminophen    morphINE    diphenhydrAMINE **OR** diphenhydrAMINE    polyethylene glycol (PEG 3350)    sennosides    bisacodyl    fleet enema    ondansetron **OR** ondansetron       Assessment/Plan:   68 year old female with PMH including but not limited to DM, HTN, neuropathy who p/t E[facility] for scheduled surgery by Dr. Martinez.   # Right Renal Calculus, Obstructed Right Nephrostomy Tube   -S/P Right Antegrade Nephrostogram, Attempted PCNL, Removal of Right Nephrostomy Tube 4/8  -s/p IR placement of right NT 4/9  -s/p Percutaneous Nephrolithotripsy, And Nephrostomy Tube Change 4/10    -cont plan per .  increase activity as tolerated   -IS 10x/hr for atelectasis; Instructed on use of IS  -PO food/fluid per surgery, bowel regimen as needed  -anselmo/post-op abx per surgery, on ancef  -some burning c ponce, pyridium if ok c     -follow bladder scan  Cephalexin x 2 days then resume day prior to NT removal. Patient to f/u on Monday for NT removal   -KUB-->Known renal stone is no longer identified     # Acute kidney injury  - Cr up to 1.28, expected c above     # Anemia, d/t acute blood loss, expected  -Hgb 11.2 to 10.1  -cont to monitor as o/p     # Acute pain, expected  -IV pain meds as  needed, will monitor and encourage transition to PO pain meds as tolerated  -Bowel regimen for associated narcotic constipation    # CP, some inspiration pain  - more likely soreness from procedure and tube site  - denies cardiac/pulm hx  - trop neg, repeat; EKG ok, NSR  - given sxs, will check CTA for completeness although lower suspicion; dimer was 1.09-->CTA neg   - BMP/CBC ok    - CXR noted   - sxs resolved today      # DM  - not on meds, pt stating takes metformin but not on med rec; will f/u as o/p  -Follow glc  - hold insulin for now as BG 70-80     # HTN  - not on meds? Pt states on a med but doesn't remember name and ran out; f/u as o/p, she said she will;   - Follow BP     # Tob abuse  -Pt was counseled on smoking cessation especially c wound healing; discussed multiple strategies including nicotine replacement and pharmacologic management, a total of 5 min spent on 4/9/2024  -pt trying to quiting smoking this past year     # DVT proph  -per         Dispo: cont post-op care, ok for dc today        Questions/concerns were discussed with patient by bedside. D/w RN and GRISELDA Rojas. Sent msg to pcp     Martin Soto MD  [facility]  [phone]  4/11/2024  1:27 PM

## 2024-04-11 NOTE — PROGRESS NOTES
Alert and oriented x 4, saturating on room air, denies chest pain and shortness of breath. Tolerating general diet, abdomen soft nontender. Denies nausea. Right nephrostomy tube intact, no leakage at site. Pain 2/10 right flank, medication declined. Voiding in bedside commode, dark red urine. IV fluids infusing as ordered. Plan of care discussed with patient, all questions addressed. Call light and personal items within reach.

## 2024-04-11 NOTE — PROGRESS NOTES
NURSING DISCHARGE NOTE    Discharged Home via Ambulance.  Accompanied by Support staff  Belongings Taken by patient/family.    Patient's VSS, tolerating diet, voiding adequately, pain controlled, tolerating ambulating, Discharge education provided, reviewed medications and follow up appointments. All questions answered and concerns addressed, patient verbalized understanding. Patient discharged in stable condition.

## 2024-04-11 NOTE — PROGRESS NOTES
Alert & oriented x4. VSS on room air. Voids in bedside commode. Tolerating diet. Ambulates with standby and walker. Right nephrostomy tube draining to leg bag, dressing c/d/I. Denies nausea/chest pain/SOB. Pain controlled. Patient updated on plan of care. Questions and concerns addressed. Safety precautions in place. Frequent rounds performed.

## 2024-04-14 ENCOUNTER — HOSPITAL ENCOUNTER (EMERGENCY)
Facility: HOSPITAL | Age: 69
Discharge: HOME | End: 2024-04-14
Attending: EMERGENCY MEDICINE
Payer: MEDICARE

## 2024-04-14 VITALS
OXYGEN SATURATION: 98 % | TEMPERATURE: 99 F | SYSTOLIC BLOOD PRESSURE: 134 MMHG | RESPIRATION RATE: 18 BRPM | BODY MASS INDEX: 23.92 KG/M2 | HEIGHT: 63 IN | HEART RATE: 91 BPM | DIASTOLIC BLOOD PRESSURE: 71 MMHG | WEIGHT: 135 LBS

## 2024-04-14 DIAGNOSIS — N99.528 NEPHROSTOMY COMPLICATION (HCC): Primary | ICD-10-CM

## 2024-04-14 PROCEDURE — 99284 EMERGENCY DEPT VISIT MOD MDM: CPT

## 2024-04-14 PROCEDURE — 99283 EMERGENCY DEPT VISIT LOW MDM: CPT

## 2024-04-14 NOTE — ED QUICK NOTES
PT PINS URINE BAG TO BREAST AREA OF SHIRT AND STATES SHE SLEEPS WITH THE BAG ON HER CHEST. PT EDUCATED THAT THE URINE DRAINAGE BAG SHOULD BE PLACED LOWER THAN TO SITE TO HELP PREVENT BACK FLOW. PT VERBALIZED UNDERSTANDING

## 2024-04-14 NOTE — ED INITIAL ASSESSMENT (HPI)
Pt presents to ER with bleeding around nephrostomy tubes. Pt states clothing is wet. Nurse unable to visualize in triage. Respirations nonlabored. No fever. Pt is a&ox3. Pt states feeling dizzy. No n/v/d.    Pt was seen here Monday due to a large kidney stone. Pt states \"they were unable to break the stone.\" Tuesday able to remove stone.

## 2024-04-14 NOTE — DISCHARGE INSTRUCTIONS
Follow-up for further evaluation with urology tomorrow as scheduled.  Keep bag and drainage tubes below the level of the kidney.  Plenty of fluids.  Continue antibiotics.  Return if new or worse symptoms.

## 2024-04-14 NOTE — ED PROVIDER NOTES
Patient Seen in: [facility] Emergency Department      History     Chief Complaint   Patient presents with    Postop/Procedure Problem     Stated Complaint: Bleeding after nephro tube placed.    Subjective:   HPI    Patient is a 68-year-old female with history of a right staghorn calculus 6.2 x 2.7.  Patient had nephrostomy tube placed last week.  Patient went home on Wednesday.  Patient has been pending the tube and drainage bag high up on her chest above the level of the kidney.  Patient has noticed some drainage to the nephrostomy tube site which is slightly serosanguineous.  Patient denies abdominal pain, no fevers or chills.  Patient has been on antibiotics.  Urinating normally.  Remainder of review of systems negative.    Objective:   No pertinent past medical history.            No pertinent past surgical history.              No pertinent social history.            Review of Systems    Positive for stated complaint: Bleeding after nephro tube placed.  Other systems are as noted in HPI.  Constitutional and vital signs reviewed.      All other systems reviewed and negative except as noted above.    Physical Exam     ED Triage Vitals [04/14/24 1454]   /71   Pulse 91   Resp 18   Temp 98.6 °F (37 °C)   Temp src Temporal   SpO2 98 %   O2 Device None (Room air)       Current:/71   Pulse 91   Temp 98.6 °F (37 °C) (Temporal)   Resp 18   Ht 160 cm (5' 3\")   Wt 61.2 kg   SpO2 98%   BMI 23.91 kg/m²         Physical Exam  GENERAL: Patient resting comfortably on the cart in no acute distress.  HEENT: Extraocular muscles intact, pupils equal round reactive to light, neck supple, no meningismus.  LUNGS: Lungs clear to auscultation bilaterally.  CARDIOVASCULAR: + S1-S2, regular rate and rhythm, no murmurs.  BACK: No CVA tenderness, no midline bony tenderness.  Right nephrostomy tube intact, no bleeding noted once dressing is removed no drainage noted.  ABDOMEN: + Bowel sounds, soft, nontender,  nondistended.  No rebound, no guarding, no hepatosplenomegaly.  EXTREMITIES: Full range of motion, no tenderness, good capillary refill.  SKIN: No rash, good turgor.  NEURO: Patient answers questions appropriately.  No focal deficits appreciated.           ED Course   Labs Reviewed - No data to display                   MDM      Patient had the bag placed on the left cart near the floor with drainage of bloody urine in the bag.  Patient states she felt much improved.  After dressing removed no active bleeding or drainage around the nephrostomy tube but will be redressed.  I did speak with urology recommend continue plenty of fluids continue antibiotics keep scheduled appointment tomorrow for nephrostomy removal.  Patient felt comfortable with this plan.  Patient is advised to keep the bag and tubes below the level of the kidney.  I did consider bleeding complication, UTI, kidney stone, complication of bag location.                               Medical Decision Making      Disposition and Plan     Clinical Impression:  1. Nephrostomy complication (HCC)         Disposition:  Discharge  4/14/2024  4:28 pm    Follow-up:  Agnes Sawyer MD  [address]  [place] [address]  [phone]    Follow up in 2 day(s)  As needed    Jesus Martinez MD  [address]   [address]  [facility] [address]  [phone]    Follow up in 1 day(s)            Medications Prescribed:  Discharge Medication List as of 4/14/2024  4:30 PM

## 2024-04-16 NOTE — CDS QUERY
DOCUMENTATION CLARIFICATION QUERY  Dear Dr. Soto,  A diagnosis of “Acute Kidney Injury (CISCO)” was documented. There is either a lack of clinical support for this condition in the current medical record, or there is a lack of recognized standard criteria to support the condition.    Please clarify the status of CISCO diagnosis after study:  [   ] Acute Kidney Injury -confirmed (please add clinical indicators supportive of your diagnosis) _________  [  x ] Acute Kidney Insufficiency   [   ] Other  (please specify) _________________      CLINICAL INDICATORS:   -4/8-4/11/24:   BUN 22 18 21  CREATSERUM 1.02 0.92 1.28*  EGFRCR 61 69 46*    -4/11 Hospitalist: # Acute kidney injury  - Cr up to 1.28         RISK FACTORS:  # Right Renal Calculus, Obstructed Right Nephrostomy Tube -S/P Right Antegrade Nephrostogram, Attempted PCNL, Removal of Right Nephrostomy Tube 4/8  -s/p IR placement of right NT 4/9  -s/p Percutaneous Nephrolithotripsy, And Nephrostomy Tube Change 4/10    TREATMENT: IV fluids, Serial labs, Monitoring Intake and Output                 Use of terms such as suspected, possible, or probable (associated with a specific diagnosis that is being evaluated, monitored, or treated as if it exists) are acceptable and can be coded in the inpatient setting, when documented at the time of discharge.     Please add any additional documentation to your progress note and continue to document this through discharge.  Carolin Saleem RN, BSN, CWOCN [facility] Clinical  [phone]                                                                                    THIS FORM IS A PART OF THE PERMANENT MEDICAL RECORD    Suturegard Body: The suture ends were repeatedly re-tightened and re-clamped to achieve the desired tissue expansion.

## 2024-04-18 LAB
CAOX MONOHYDRATE: 10 %
URIC ACID-STONE: 90 %
WEIGHT-STONE: 201 MG

## 2024-04-19 NOTE — DISCHARGE SUMMARY
[facility]  Discharge Summary    America Scott Patient Status:  Inpatient    1955 MRN [id number]   Location [facility] 3NW-A Attending No att. providers found   Hosp Day # 3 PCP Agnes Sawyer MD     Date of Admission: 2024    Date of Discharge: 2024  4:00 PM    Admitting Diagnosis: STAGHORN RENAL CALCULUS  Staghorn renal calculus    Discharge Diagnosis:   Patient Active Problem List   Diagnosis    Type 2 diabetes mellitus with diabetic neuropathy, without long-term current use of insulin (HCC)    Kidney stone    Renal insufficiency    Urinary tract infection without hematuria, site unspecified    Anxiety    Degenerative arthritis of hip    Hip arthritis    Intrinsic eczema    Staghorn renal calculus       Reason for Admission: Right 6 cm staghorn renal calculus.  Admitted for PCNL.    History of Present Illness: 6 cm right staghorn renal calculus diagnosed on recent admission for pyelonephritis.  She had a previous attempt at PCNL but the nephrostomy tube had encrusted and access to the kidney could not be gained at that time.  The nephrostomy tube was replaced that admission and she returns now for another attempted PCNL.    Hospital Course: Uncomplicated right PCNL.  Uncomplicated course.  Stone appears to have been completely removed.     Procedures: Right PCNL 4/10/2024    Complications: None    Disposition:   Home    Discharge Condition: Good    Discharge Medications:   Discharge Medication List as of 2024  3:42 PM        CONTINUE these medications which have CHANGED    Details   cephalexin 500 MG Oral Cap Take 1 capsule (500 mg total) by mouth 3 (three) times daily for 5 days. Take medication for 2 days then resume one day prior to your nephrostomy tube removal, Normal, Disp-15 capsule, R-0           CONTINUE these medications which have NOT CHANGED    Details   acetaminophen 500 MG Oral Tab Take 1 tablet (500 mg total) by mouth every 6 (six) hours as needed for Pain.,  Historical             Follow up Visits: Follow-up with Dr. Martinez on Monday for nephrostomy tube removal.    Jesus Martinez MD  4/19/2024  10:27 AM

## 2024-09-24 NOTE — CM/SW NOTE
Pt is a 67 yo female admitted for a kidney stone.  Pt had nephrostomy tubes placed.  She is +UTI.  She is currently on IV ABX but should not need them for home.  Pt lives alone but her dtr does stay with her at times.  SDOH addressed with pt - she declined resources for food insecurity and smoking saying she has stopped smoking.  No dc needs acticipated.  / to remain available for support and/or discharge planning.      03/19/24 1000   CM/SW Referral Data   Referral Source Physician   Reason for Referral Discharge planning   Informant Patient   Patient Info   Patient's Home Environment House   Patient lives with Alone        no elevations ro depressions

## (undated) DEVICE — Device

## (undated) DEVICE — KIT LITHO PRB 3.9X440MM W/ STONE CTCH

## (undated) DEVICE — HIGH PRESSURE NEPHROSTOMY BALLOON CATHETER KIT: Brand: NEPHROMAX KIT

## (undated) DEVICE — GUIDEWIRE URO L150CM DIA0.035IN TAPR 3CM NIT

## (undated) DEVICE — PERCUTANEUOS NEPHROLOGY CDS: Brand: MEDLINE INDUSTRIES, INC.

## (undated) DEVICE — 3M™ TEGADERM™ TRANSPARENT FILM DRESSING FRAME STYLE, 1626W, 4 IN X 4-3/4 IN (10 CM X 12 CM), 50/CT 4CT/CASE: Brand: 3M™ TEGADERM™

## (undated) DEVICE — CATHETER URET 5FR L70CM FLX OPN TIP NONPORTED

## (undated) DEVICE — TAPE,PAPER,CURAD,2"X10YD,W/DISPENSER,LF: Brand: CURAD

## (undated) DEVICE — PERC NCIRCLE, NITINOL TIPLESS STONE EXTRACTOR: Brand: PERC NCIRCLE

## (undated) DEVICE — SOLUTION IRRIG 1000ML 0.9% NACL USP BTL

## (undated) DEVICE — SEAL BIOPSY PORT ACMI

## (undated) DEVICE — NITINOL STONE RETRIEVAL BASKET: Brand: ZERO TIP

## (undated) DEVICE — ADAPTER BX SEAL PRT ADJ FOR HYSTEROSCOPE

## (undated) DEVICE — PAD,ABDOMINAL,8"X7.5",ST,LF,20/BX: Brand: MEDLINE INDUSTRIES, INC.

## (undated) DEVICE — SPONGE GZ 4X4IN COT 12 PLY TYP VII WVN C

## (undated) DEVICE — MEDI-VAC NON-CONDUCTIVE SUCTION TUBING: Brand: CARDINAL HEALTH

## (undated) DEVICE — SYRINGE MED 30ML STD CLR PLAS LL TIP N CTRL

## (undated) DEVICE — GLOVE SUR 7.5 SENSICARE PI PIP CRM PWD F

## (undated) DEVICE — SLEEVE COMPR MD KNEE LEN SGL USE KENDALL SCD

## (undated) DEVICE — ADHESIVE SKIN TOP FOR WND CLSR DERMBND ADV

## (undated) DEVICE — SOLUTION IRRIG 3000ML 0.9% NACL FLX CONT

## (undated) DEVICE — SINGLE-USE DIGITAL FLEXIBLE URETEROSCOPE: Brand: LITHOVUE

## (undated) DEVICE — GUIDEWIRE: Brand: AMPLATZ SUPER STIFF

## (undated) DEVICE — SYRINGE MED 20ML STD CLR PLAS LL TIP N CTRL

## (undated) DEVICE — QUANTA OF 365UM  FIBER

## (undated) DEVICE — DUAL LUMEN URETERAL CATHETER

## (undated) DEVICE — ZIPWIRE GUIDEWIRE .035X150 STR

## (undated) DEVICE — UNDYED BRAIDED (POLYGLACTIN 910), SYNTHETIC ABSORBABLE SUTURE: Brand: COATED VICRYL

## (undated) DEVICE — LASER SURG HI PWR HOLM

## (undated) NOTE — LETTER
[address]  [address]  Consent for Procedure/Sedation  Date: 03/18/2024         Time: 1500    I hereby authorize Dr. Cohn, my physician and his/her assistants (if applicable), which may include medical students, residents, and/or fellows, to perform the following surgical operation/ procedure and administer such anesthesia as may be determined necessary by my physician: Right sided nephrostomy tube insertion on America Scott  2.   I recognize that during the surgical operation/procedure, unforeseen conditions may necessitate additional or different procedures than those listed above.  I, therefore, further authorize and request that the above-named surgeon, assistants, or designees perform such procedures as are, in their judgment, necessary and desirable.    3.   My surgeon/physician has discussed prior to my surgery the potential benefits, risks and side effects of this procedure; the likelihood of achieving goals; and potential problems that might occur during recuperation.  They also discussed reasonable alternatives to the procedure, including risks, benefits, and side effects related to the alternatives and risks related to not receiving this procedure.  I have had all my questions answered and I acknowledge that no guarantee has been made as to the result that may be obtained.    4.   Should the need arise during my operation/procedure, which includes change of level of care prior to discharge, I also consent to the administration of blood and/or blood products.  Further, I understand that despite careful testing and screening of blood or blood products by collecting agencies, I may still be subject to ill effects as a result of receiving a blood transfusion and/or blood products.  The following are some, but not all, of the potential risks that can occur: fever and allergic reactions, hemolytic reactions, transmission of diseases such as Hepatitis, AIDS and  Cytomegalovirus (CMV) and fluid overload.  In the event that I wish to have an autologous transfusion of my own blood, or a directed donor transfusion, I will discuss this with my physician.   Check only if Refusing Blood or Blood Products  I understand refusal of blood or blood products as deemed necessary by my physician may have serious consequences to my condition to include possible death. I hereby assume responsibility for my refusal and release the hospital, its personnel, and my physicians from any responsibility for the consequences of my refusal.         o  Refuse         5.   I authorize the use of any specimen, organs, tissues, body parts or foreign objects that may be removed from my body during the operation/procedure for diagnosis, research or teaching purposes and their subsequent disposal by hospital authorities.  I also authorize the release of specimen test results and/or written reports to my treating physician on the hospital medical staff or other referring or consulting physicians involved in my care, at the discretion of the Pathologist or my treating physician.    6.   I consent to the photographing or videotaping of the operations or procedures to be performed, including appropriate portions of my body for medical, scientific, or educational purposes, provided my identity is not revealed by the pictures or by descriptive texts accompanying them.  If the procedure has been photographed/videotaped, the surgeon will obtain the original picture, image, videotape or CD.  The hospital will not be responsible for storage, release or maintenance of the picture, image, tape or CD.    7.   I consent to the presence of a  or observers in the operating room as deemed necessary by my physician or their designees.    8.   I recognize that in the event my procedure results in extended X-Ray/fluoroscopy time, I may develop a skin reaction.    9. If I have a Do Not Attempt Resuscitation  (DNAR) order in place, that status will be suspended while in the operating room, procedural suite, and during the recovery period unless otherwise explicitly stated by me (or a person authorized to consent on my behalf). The surgeon or my attending physician will determine when the applicable recovery period ends for purposes of reinstating the DNAR order.  10. Patients having a sterilization procedure: I understand that if the procedure is successful the results will be permanent and it will therefore be impossible for me to inseminate, conceive, or bear children.  I also understand that the procedure is intended to result in sterility, although the result has not been guaranteed.   11. I acknowledge that my physician has explained sedation/analgesia administration to me including the risk and benefits I consent to the administration of sedation/analgesia as may be necessary or desirable in the judgment of my physician.    I CERTIFY THAT I HAVE READ AND FULLY UNDERSTAND THE ABOVE CONSENT TO OPERATION and/or OTHER PROCEDURE.        ____________________________________       _________________________________      ______________________________  Signature of Patient         Signature of Responsible Person        Printed Name of Responsible Person        ____________________________________      _________________________________      ______________________________       Signature of Witness          Relationship to Patient                       Date                                       Time  Patient Name: America Scott  : 1955    Reviewed: 2024   Printed: 2024  Medical Record #: [id number] Page 1 of 1

## (undated) NOTE — LETTER
[address]  [address]  Authorization for Surgical Operation and Procedure     Date:___________                                                                                                         Time:__________  I hereby authorize Surgeon(s):  Jesus Martinez MD, my physician and his/her assistants (if applicable), which may include medical students, residents, and/or fellows, to perform the following surgical operation/ procedure and administer such anesthesia as may be determined necessary by my physician:  Operation/Procedure name (s) Procedure(s):  RIGHT PERCUTANEOUS NEPHROLITHOTOMY on America Scott   2.   I recognize that during the surgical operation/procedure, unforeseen conditions may necessitate additional or different procedures than those listed above.  I, therefore, further authorize and request that the above-named surgeon, assistants, or designees perform such procedures as are, in their judgment, necessary and desirable.    3.   My surgeon/physician has discussed prior to my surgery the potential benefits, risks and side effects of this procedure; the likelihood of achieving goals; and potential problems that might occur during recuperation.  They also discussed reasonable alternatives to the procedure, including risks, benefits, and side effects related to the alternatives and risks related to not receiving this procedure.  I have had all my questions answered and I acknowledge that no guarantee has been made as to the result that may be obtained.    4.   Should the need arise during my operation/procedure, which includes change of level of care prior to discharge, I also consent to the administration of blood and/or blood products.  Further, I understand that despite careful testing and screening of blood or blood products by collecting agencies, I may still be subject to ill effects as a result of receiving a blood transfusion and/or blood products.   The following are some, but not all, of the potential risks that can occur: fever and allergic reactions, hemolytic reactions, transmission of diseases such as Hepatitis, AIDS and Cytomegalovirus (CMV) and fluid overload.  In the event that I wish to have an autologous transfusion of my own blood, or a directed donor transfusion, I will discuss this with my physician.  Check only if Refusing Blood or Blood Products  I understand refusal of blood or blood products as deemed necessary by my physician may have serious consequences to my condition to include possible death. I hereby assume responsibility for my refusal and release the hospital, its personnel, and my physicians from any responsibility for the consequences of my refusal.          o  Refuse      5.   I authorize the use of any specimen, organs, tissues, body parts or foreign objects that may be removed from my body during the operation/procedure for diagnosis, research or teaching purposes and their subsequent disposal by hospital authorities.  I also authorize the release of specimen test results and/or written reports to my treating physician on the hospital medical staff or other referring or consulting physicians involved in my care, at the discretion of the Pathologist or my treating physician.    6.   I consent to the photographing or videotaping of the operations or procedures to be performed, including appropriate portions of my body for medical, scientific, or educational purposes, provided my identity is not revealed by the pictures or by descriptive texts accompanying them.  If the procedure has been photographed/videotaped, the surgeon will obtain the original picture, image, videotape or CD.  The hospital will not be responsible for storage, release or maintenance of the picture, image, tape or CD.    7.   I consent to the presence of a  or observers in the operating room as deemed necessary by my physician or their  designees.    8.   I recognize that in the event my procedure results in extended X-Ray/fluoroscopy time, I may develop a skin reaction.    9. If I have a Do Not Attempt Resuscitation (DNAR) order in place, that status will be suspended while in the operating room, procedural suite, and during the recovery period unless otherwise explicitly stated by me (or a person authorized to consent on my behalf). The surgeon or my attending physician will determine when the applicable recovery period ends for purposes of reinstating the DNAR order.  10. Patients having a sterilization procedure: I understand that if the procedure is successful the results will be permanent and it will therefore be impossible for me to inseminate, conceive, or bear children.  I also understand that the procedure is intended to result in sterility, although the result has not been guaranteed.   11. I acknowledge that my physician has explained sedation/analgesia administration to me including the risk and benefits I consent to the administration of sedation/analgesia as may be necessary or desirable in the judgment of my physician.    I CERTIFY THAT I HAVE READ AND FULLY UNDERSTAND THE ABOVE CONSENT TO OPERATION and/or OTHER PROCEDURE.    _________________________________________  __________________________________  Signature of Patient     Signature of Responsible Person         ___________________________________         Printed Name of Responsible Person           _________________________________                 Relationship to Patient  _________________________________________  ______________________________  Signature of Witness          Date  Time      Patient Name: America Scott     : 1955                 Printed: April 10, 2024     Medical Record #: [id number]                     Page 1 of [address] [place] [address]  [id number]    Consent for Anesthesia    America VARELA  Braulio Scott agree to be cared for by an anesthesiologist, who is specially trained to monitor me and give me medicine to put me to sleep or keep me comfortable during my procedure    I understand that my anesthesiologist is not an employee or agent of [facility] [employer]. He or she works for [employer].    As the patient asking for anesthesia services, I agree to:  Allow the anesthesiologist (anesthesia doctor) to give me medicine and do additional procedures as necessary. Some examples are: Starting or using an “IV” to give me medicine, fluids or blood during my procedure, and having a breathing tube placed to help me breathe when I’m asleep (intubation). In the event that my heart stops working properly, I understand that my anesthesiologist will make every effort to sustain my life, unless otherwise directed by [facility] Do Not Resuscitate documents.  Tell my anesthesia doctor before my procedure:  If I am pregnant.  The last time that I ate or drank.  All of the medicines I take (including prescriptions, herbal supplements, and pills I can buy without a prescription (including street drugs/illegal medications). Failure to inform my anesthesiologist about these medicines may increase my risk of anesthetic complications.  If I am allergic to anything or have had a reaction to anesthesia before.  I understand how the anesthesia medicine will help me (benefits).  I understand that with any type of anesthesia medicine there are risks:  The most common risks are: nausea, vomiting, sore throat, muscle soreness, damage to my eyes, mouth, or teeth (from breathing tube placement).  Rare risks include: remembering what happened during my procedure, allergic reactions to medications, injury to my airway, heart, lungs, vision, nerves, or muscles and in extremely rare instances death.  My doctor has explained to me other choices available to me for my care  (alternatives).  Pregnant Patients (“epidural”):  I understand that the risks of having an epidural (medicine given into my back to help control pain during labor), include itching, low blood pressure, difficulty urinating, headache or slowing of the baby’s heart. Very rare risks include infection, bleeding, seizure, irregular heart rhythms and nerve injury.  Regional Anesthesia (“spinal”, “epidural”, & “nerve blocks”):  I understand that rare but potential complications include headache, bleeding, infection, seizure, irregular heart rhythms, and nerve injury.    I can change my mind about having anesthesia services at any time before I get the medicine.    _____________________________________________________________________________  Patient (or Representative) Signature/Relationship to Patient  Date   Time    _____________________________________________________________________________   Name (if used)    Language/Organization   Time    _____________________________________________________________________________  Anesthesiologist Signature     Date   Time  I have discussed the procedure and information above with the patient (or patient’s representative) and answered their questions. The patient or their representative has agreed to have anesthesia services.    _____________________________________________________________________________  Witness        Date   Time  I have verified that the signature is that of the patient or patient’s representative, and that it was signed before the procedure  Patient Name: America Scott     : 1955                 Printed: April 10, 2024     Medical Record #: [id number]                     Page 2 of 2

## (undated) NOTE — IP AVS SNAPSHOT
Patient Demographics     Address  [address] [facility] [address] Phone  [phone] (Home)  [phone] (Mobile) E-mail Address  [email or web address]      Patient Contacts     Name Relation Home Work Mobile    Anjel Scott Daughter   [phone]      Allergies as of 4/11/2024  Review status set to Review Complete on 4/10/2024   No Known Allergies     Code Status Information     Code Status    Not on file        Patient Instructions         What to Expect After Surgery  Mild to moderate bleeding in the urine is common after PCNL. Patients often have a mild to moderate amount of pain at the site of the nephrostomy tube which is easily managed with oral pain medications. If you have a ureteral stent in place, you may also experience some burning with urination, urgency, or increased urinary frequency.  Drainage of urine from the incision is typical for several days.    Risks Associated with Procedure  Bleeding  Infection  Need for re-treatment    What to Do After Your Procedure  Drink plenty of fluids, 10-12 (8 oz) glasses of water per day, unless given specific restrictions.  Take your pain medications as prescribed, when needed. These medications may cause constipation.  Avoid straining and constipation. You can prevent constipation by drinking fluids and adding fruit and vegetables to your diet. You may also add stool softners as needed to prevent constipation.  If prescribed additional medications (i.e. Rapaflo, Flomax, or Uroxatral) take as directed. Finish/discard antibiotic prescription given prior to procedure  You may resume any prescription medications, but DO NOT take any aspirin, ibuprofen or blood thinners until approved by your physician.  Ambulate often, but rest when you become tired.    Activity Restrictions  You may resume normal activities as tolerated. Walking and moving about aids in the passage of some stone particles. However, avoid sports or really strenuous exercise until  there is no blood in your urine, or when physician approves.  Do not lift more than 15 lbs for 4-6 weeks  You may shower. Do not soak your incision in a hot tub or bath tub until it is healed.  Do not drive for 2 weeks; this should be at least four days after the last dose of pain medication.    When to Call Your Doctor  You cannot pass urine.  Your urine becomes so bloody that you cannot see through it.  Your fever is over 100.5° F (orally) for two readings taken 4 hours apart.  You have severe burning, pain, and irritation with urination.  Your pain is unrelieved with pain medication.  You have persistent nausea or vomiting.    Follow-Up Care:    Please call to schedule your follow up appointment the day you come home from surgery.    FOLLOW-UP ON MONDAY 4/15/24 FOR NEPHROSTOMY TUBE REMOVAL.    TAKE CEPHALEXIN FOR 2 DAYS THEN RESUME ONE DAY PRIOR TO NEPHROSTOMY TUBE REMOVAL    __________________________________________________________________________    You will be instructed upon discharge when to follow-up with your physician. If you have a stent placed or nephrostomy tube you will be given instructions when it is to be removed. In some cases you will be able to remove the stent yourself at home or you will be required to schedule an in-office procedure in which the stent will be removed. At the time of your follow-up appointment, an x-ray will be performed in order to evaluate the success of the procedure. Please arrive 10-15 minutes early in order to complete the x-ray before seeing your physician.  Note: Some stents have a thread/string that may be seen coming from the urethra. DO NOT pull or dislodge the string. If you notice increased leakage of urine, notify your physician.    Thank you for letting us be involved in your healthcare.           Follow-up Information     Jesus Martinez MD Follow up on 4/15/2024.    Specialty: UROLOGY  Why: nephrostomy tube removal  Contact information:  [address]  [facility] [address]  [phone]                        Your Home Meds List      TAKE these medications       Instructions Authorizing Provider Morning Afternoon Evening As Needed   acetaminophen 500 MG Tabs  Commonly known as: Tylenol Extra Strength      Take 1 tablet (500 mg total) by mouth every 6 (six) hours as needed for Pain.          cephalexin 500 MG Caps  Commonly known as: Keflex      Take 1 capsule (500 mg total) by mouth 3 (three) times daily for 5 days. Take medication for 2 days then resume one day prior to your nephrostomy tube removal  Stop taking on: April 16, 2024   Desi Lockesing               Where to Get Your Medications      These medications were sent to [id number] - [place], [place] - [address] [phone], [phone]  [address], [facility] [address]    Phone: [phone]   cephalexin 500 MG Caps           [id number] - MAR ACTION REPORT  (last 48 hrs)    ** SITE UNKNOWN **     Order ID Medication Name Action Time Action Reason Comments    [id number] HYDROcodone-acetaminophen (Norco)  MG per tab 1 tablet 04/09/24 1545 Given      [id number] HYDROcodone-acetaminophen (Norco)  MG per tab 1 tablet 04/09/24 2122 Given      [id number] HYDROcodone-acetaminophen (Norco)  MG per tab 1 tablet 04/11/24 0954 Given      [id number] acetaminophen (Tylenol) tab 650 mg 04/10/24 1803 Given      [id number] ceFAZolin (Ancef) 1 g in dextrose 5% 100mL IVPB-ADD 04/09/24 2122 New Bag      [id number] iopamidol 76% (ISOVUE-370) injection for power injector 04/10/24 1346 Given      [id number] lactated ringers infusion 04/10/24 0154 New Bag      [id number] morphINE PF 4 MG/ML injection 2 mg 04/09/24 1704 Given      [id number] morphINE PF 4 MG/ML injection 2 mg 04/09/24 1922 Given      [id number] morphINE PF 4 MG/ML injection 2 mg 04/09/24 2259 Given      [id number] morphINE PF 4 MG/ML injection 2 mg 04/10/24 0154 Given      [id number] morphINE PF 4 MG/ML injection 2 mg 04/10/24 0638 Given       [id number] phenazopyridine (Pyridium) tab 200 mg 04/09/24 1603 Given            RIGHT UPPER ARM     Order ID Medication Name Action Time Action Reason Comments    [id number] insulin aspart (NovoLOG) 100 Units/mL FlexPen 1-5 Units 04/11/24 1253 Given  152            Recent Vital Signs    Flowsheet Row Most Recent Value   /48 Filed at 04/11/2024 1247   Pulse 89 Filed at 04/11/2024 1247   Resp 18 Filed at 04/11/2024 1247   Temp 98.8 °F (37.1 °C) Filed at 04/11/2024 1247   SpO2 95 % Filed at 04/11/2024 1247      Patient's Most Recent Weight    Flowsheet Row Most Recent Value   Patient Weight 61.2 kg (135 lb)         Lab Results Last 24 Hours      Basic Metabolic Panel (8) [[id number]] (Abnormal)  Resulted: 04/11/24 0701, Result status: Final result   Ordering provider: Jesus Martinez MD  04/10/24 2300 Resulting lab: [facility] ([facility])    Specimen Information    Type Source Collected On   Blood — 04/11/24 0524          Components    Component Value Reference Range Flag Lab   Glucose 143 70 - 99 mg/dL [facility])   Sodium 138 136 - 145 mmol/L — [facility])   Potassium 4.5 3.5 - 5.1 mmol/L — [facility])   Chloride 112 98 - 112 mmol/L — [facility])   CO2 24.0 21.0 - 32.0 mmol/L — [facility])   Anion Gap 2 0 - 18 mmol/L — [facility])   BUN 21 9 - 23 mg/dL — [facility])   Creatinine 1.28 0.55 - 1.02 mg/dL [facility] ([facility])   Calcium, Total 9.5 8.5 - 10.1 mg/dL — [facility])   Calculated Osmolality 291 275 - 295 mOsm/kg — [facility] ([facility])   eGFR-Cr 46 >=60 mL/min/1.73m2 L [facility] ([facility])            Hemoglobin [[id number]] (Abnormal)  Resulted: 04/11/24 0554, Result status: Final result   Ordering provider: Jesus Martinez MD  04/10/24 2300 Resulting lab: [facility] ([facility])    Specimen Information    Type Source Collected On   Blood — 04/11/24 0522          Components    Component Value Reference Range Flag Lab   HGB 10.1 12.0 - 16.0  g/dL [facility] ([facility])            Testing Performed By     Lab - Abbreviation Name Director Address Valid Date Range    139 - [facility] ([facility]) [facility] ([facility]) Goldberg, Cathryn A. MD [address] [place] [id number] 20 1441 - Present            Microbiology Results (All)     Procedure Component Value Units Date/Time    Calculi, Urinary [[id number]] Collected: 04/10/24 1544    Order Status: Sent Lab Status: In process Updated: 24 1347    Specimen: Kidney stone (calculus)       Pending Labs     Order Current Status    Calculi, Urinary In process         H&P - H&P Note      H&P signed by Rober Tim MD at 2024  5:28 PM  Version 1 of 1    Author: Rober Tim MD Service: Radiology Author Type: Physician    Filed: 2024  5:28 PM Date of Service: 2024  5:27 PM Status: Signed    : Rober Tim MD (Physician)       [facility]   [facility] of [facility]   History & Physical    America Scott Patient Status:  Inpatient    1955 MRN [id number]   Location [facility] 3NW-A Attending Jesus Martinez MD   Hosp Day # 1 PCP Agnes Sawyer MD     Admitting Diagnosis:   Renal stones    History of Present Illness:   69 yo F staghorn calc on R w/ need for access for PCNL    History   Past Medical History:  Past Medical History:   Diagnosis Date    Back problem     Calculus of kidney     Diabetes (HCC)     NO CURRENT MEDICATION    Edema     Essential hypertension     High blood pressure     NO CURRENT MEDICATION    High cholesterol     NO CURRENT MEDICATION    Hx of motion sickness     Muscle weakness     USING A CANE/WALKER    Neuropathy     Osteoarthritis     PONV (postoperative nausea and vomiting)     Visual impairment     GLASSES       Past Surgical History:  Past Surgical History:   Procedure Laterality Date    TONSILLECTOMY      TUBAL LIGATION         Social History:  Social History     Tobacco Use    Smoking status: Former     Packs/day: 0.75      Years: 35.00     Additional pack years: 0.00     Total pack years: 26.25     Types: Cigarettes    Smokeless tobacco: Never   Substance Use Topics    Alcohol use: Not Currently        Family History:  History reviewed. No pertinent family history.    Allergies/Medications:   Allergies:  No Known Allergies    Medications:  No current outpatient medications on file.    Physical Exam & Review of Systems:   Physical Exam:    /51 (BP Location: Right arm)   Pulse 80   Temp 98.2 °F (36.8 °C) (Oral)   Resp 20   Ht 63\"   Wt 135 lb   SpO2 98%   BMI 23.91 kg/m²     General: NAD  Neck: No JVD  Lungs: CTA bilat  Heart: RRR, S1, S2  Abdomen: Soft, NT/ND, BS+x4  Extremities: Warm, dry, no LE edema bilat  Pulses: 2+ bilat DP    Results:   Labs:  Recent Labs   Lab 04/09/24  0622   HGB 11.0*     Recent Labs   Lab 04/09/24 0622   PTP 14.3   INR 1.11     Recent Labs   Lab 04/09/24 0622   *   BUN 22   CREATSERUM 1.02   CA 10.1      K 4.6      CO2 23.0       Assessment/Plan:   Impression: 67 yo F staghorn calculus R kidney    I have discussed with the patient and/or legal representative the potential benefits, risks, and side effects of this procedure, the likelihood of the patient achieving goals; and the potential problems that might occur during recuperation.  I discussed reasonable alternatives to the procedure, including risks, benefits and side effects related to the alternatives, and risks related to not receiving this procedure.      Recommendations: R nephrostomy vs nephroureterostomy tube placement for PCNL    Rober Tim MD  4/9/2024  5:27 PM        Electronically signed by Rober Tim MD on 4/9/2024  5:28 PM              Consults - MD Consult Notes      Consults signed by Martin Soto MD at 4/9/2024  1:13 AM     Author: Martin Soto MD Service: Hospitalist Author Type: Physician    Filed: 4/9/2024  1:13 AM Date of Service: 4/8/2024  6:34 PM Status: Signed    : Charles  Martin JUDGE MD (Physician)     Consult Orders    1. Consult to Hospitalist [[id number]] ordered by Jesus Martinez MD at 04/08/24 1353             General Medicine Consult      Consulted by: Jesus Martinez MD    PCP: Agnes Sawyer MD    Reason for Consultation: Medical Management    History of Present Illness: Patient is a 68 year old female with PMH including but not limited to DM, HTN, neuropathy who p/t EH for scheduled surgery by Jesus Farias MD. Pt is currently POD 0. Feels ok, no n/v. Feels cold. Some soreness from prior nephrostomy tube.   Pt saw Agnes Sawyer MD 3/24 and I reviewed the note. Pt denies any significant history of cardiac or pulmonary conditions. Quiting smoking this past year, denies etoh.     PMH:  Past Medical History:   Diagnosis Date    Back problem     Calculus of kidney     Diabetes (HCC)     NO CURRENT MEDICATION    Edema     Essential hypertension     High blood pressure     NO CURRENT MEDICATION    High cholesterol     NO CURRENT MEDICATION    Hx of motion sickness     Muscle weakness     USING A CANE/WALKER    Neuropathy     Osteoarthritis     PONV (postoperative nausea and vomiting)     Visual impairment     GLASSES        PSH:  Past Surgical History:   Procedure Laterality Date    TONSILLECTOMY      TUBAL LIGATION          HOME MEDS  ceFAZolin, 1 g, Intravenous, Q8H        ALL:  No Known Allergies     Soc Hx:  Social History     Tobacco Use    Smoking status: Former     Packs/day: 0.75     Years: 35.00     Additional pack years: 0.00     Total pack years: 26.25     Types: Cigarettes    Smokeless tobacco: Never   Substance Use Topics    Alcohol use: Not Currently        Fam Hx  History reviewed. No pertinent family history.    Review of Systems  Comprehensive ROS reviewed and negative except for what's stated above.  Including negative for chest pain, shortness of breath, syncope.       OBJECTIVE:  /68   Pulse 82   Temp 97.5 °F (36.4 °C) (Temporal)   Resp 12   Ht 5' 3\"  (1.6 m)   Wt 135 lb (61.2 kg)   SpO2 100%   BMI 23.91 kg/m²     General:  Alert, no distress, appears stated age.     Head:  Normocephalic, without obvious abnormality, atraumatic.   Eyes:  Sclera anicteric, EOMs intact.    Nose: Nares normal,  Mucosa normal    Throat: Lips normal   Neck: Supple, symmetrical, trachea midline   Lungs:   Clear to auscultation bilaterally. Normal effort   Chest wall:  No tenderness or deformity   Heart:  Regular rate and rhythm, S1, S2 normal, no murmur, rub or gallop appreciated   Abdomen:   Soft, NT/ND, Bowel sounds normal. No masses,  No organomegaly.    Extremities: Extremities normal, atraumatic, no cyanosis or LE edema.   Skin: Skin color, texture, turgor normal. No rashes or lesions.    Neurologic: Moving all extremities spontaneously, no focal deficit appreciated          Diagnostics:   CBC/Chem  No results for input(s): \"WBC\", \"HGB\", \"MCV\", \"PLT\", \"BAND\", \"INR\" in the last 168 hours.    Invalid input(s): \"LYM#\", \"MONO#\", \"BASOS#\", \"EOSIN#\"    No results for input(s): \"NA\", \"K\", \"CL\", \"CO2\", \"BUN\", \"CREATSERUM\", \"GLU\", \"CA\", \"CAION\", \"MG\", \"PHOS\" in the last 168 hours.    No results for input(s): \"ALT\", \"AST\", \"ALB\", \"AMYLASE\", \"LIPASE\", \"LDH\" in the last 168 hours.    Invalid input(s): \"ALPHOS\", \"TBIL\", \"DBIL\", \"TPROT\"      Radiology: XR OR - N/C    Result Date: 4/8/2024  PROCEDURE:  XR OR - N/C  COMPARISON:  None.  INDICATIONS:  RIGHT PERCUTANEOUS NEPHROLITHOTOMY (Right)  TECHNIQUE:   FLUOROSCOPY IMAGES OBTAINED:  4 FLUOROSCOPY TIME:  9 minutes 26 seconds TECHNOLOGIST TIME:  2 hours RADIATION DOSE (AIR KERMA PRODUCT):  120.56uGy/m2   FINDINGS:  Fluoroscopic guidance for urologic procedure.            CONCLUSION:  Fluoroscopic guidance for urologic procedure.   LOCATION:  [facility]    Dictated by (CST): Freddy Spencer MD on 4/08/2024 at 4:41 PM     Finalized by (CST): Freddy Spencer MD on 4/08/2024 at 4:42 PM       US VENOUS DOPPLER LEG BILAT - DIAG IMG (CPT=93970)    Result  Date: 3/24/2024  PROCEDURE:  US VENOUS DOPPLER LEG BILAT - DIAG IMG (CPT=93970)  COMPARISON:  None.  INDICATIONS:  eval for DVT  TECHNIQUE:  Real time, grey scale, and duplex ultrasound was used to evaluate the lower extremity venous system. B-mode two-dimensional images of the vascular structures, Doppler spectral analysis, and color flow.  Doppler imaging were performed.  The following veins were imaged bilaterally:  Common, deep, and superficial femoral, popliteal, sapheno-femoral junction, and posterior tibial veins.  PATIENT STATED HISTORY: (As transcribed by Technologist)     FINDINGS:  SAPHENOFEMORAL JUNCTION:  No reflux. THROMBI:  None visible. COMPRESSION:  Normal compressibility, phasicity, and augmentation. OTHER:  There is moderate soft tissue edema in the calves bilaterally..            CONCLUSION:  Limited evaluation secondary to soft tissue edematous changes in the calves.  There is no sonographic evidence of DVT in either leg.   LOCATION:  [facility]   Dictated by (CST): Lucila Kyle MD on 3/24/2024 at 7:40 AM     Finalized by (CST): Lucila Kyle MD on 3/24/2024 at 7:41 AM       IR NEPHROSTOMY TUBE    Result Date: 3/18/2024  PROCEDURE:  IR NEPHROSTOMY TUBE INSERTION EXCHANGE CHECK  COMPARISON:  None.  INDICATIONS:  Obstructing nephrolithiasis.  DESCRIPTION OF PROCEDURE:  Witnessed verbal and written informed consent was obtained.  Time out was performed by the staff.  TECHNIQUE: Prior to the procedure, I discussed with the patient and/or legal representative the potential benefits, risks, and side effects of this procedure, the likelihood of the patient achieving goals; and the potential problems that might occur during recuperation.  I discussed reasonable alternatives to the procedure, including risks, benefits, steps to prevent infection and side effects related to the alternatives, and risks related to not receiving this procedure. A witnessed verbal and signed consent was obtained and documented  in the patient's chart.  IV was checked and maintained by the nurse. Moderate conscious sedation was performed under continuous pulse oximetry and cardiac monitoring under my direct supervision.  The radiology nurse was in attendance throughout the exam. Moderate conscious sedation of 4 mg Versed and 200 mcg of Fentanyl was given.  Patient was assessed and monitoring of oxygen saturation, heart rate, and blood pressure by the nursing staff and myself during the exam for a total intraservice time of 25 minutes of conscious sedation time from 1556 to 1643.  500 milligrams of Levaquin were given pre-procedurally via existing IV.  The patient was placed prone on the angiographic table and the back was prepped and covered with a full body drape in the usual sterile fashion.  All operators present for the case performed standard pre-procedural prep including hand washing, sterile gloves, gown, mask, and cap.  All aspects of the maximum sterile barrier technique were followed.  A preprocedural time out was performed with all physicians, technologists, and nurses involved with the procedure.  Local anesthesia was obtained by 1% lidocaine.  Under sonographic guidance using an Accustick system a right posterior inferior calyx was accessed.  Urine returned through the needle.  A small amount of contrast was injected demonstrating correct position of the needle.  Then a wire was advanced into the right ureter under fluoroscopic guidance and the Accustick sheath was placed.  A Wilson wire was passed through the sheath with its tip in the distal ureter under fluoroscopic guidance.  Dilators were passed over the wire.  Then a 8.5 Fr nephrostomy tube was advanced into the renal pelvis   Urine returned through the tube.  A small amount of contrast was injected obtaining a nephrostogram.  RIGHT NEPHROSTOGRAM:  There was mild-to-moderate hydronephrosis.  Large staghorn calculus in the renal pelvis.  Lower pole caliceal calculi.  The  distal aspect of the right nephrostomy tube was coiled in the renal pelvis and locked and the external portion was placed to bag gravity drainage. The catheter was secured to the skin with non-absorbable suture.  The patient tolerated the procedure well without immediate complications.  Routine post nephrostomy tube instructions were communicated to the nursing staff and documented in the chart.  ESTIMATED BLOOD LOSS: Less than 5cc.  CONTRAST:  50 mL of Omnipaque 350 and Visipaque 320.  FLUOROSCOPY TIME/DOSE:  15.3 minutes  AIR KERMA:  171.37 mGy            CONCLUSION: Successful placement of right 8.5 [place] of [place] nephrostomy tube  LOCATION:  [facility]    Dictated by (CST): Lalit Ramirez MD on 3/18/2024 at 5:03 PM     Finalized by (CST): Lalit Ramirez MD on 3/18/2024 at 5:05 PM       CT ABDOMEN+PELVIS(CONTRAST ONLY)(CPT=74177)    Result Date: 3/17/2024  PROCEDURE:  CT ABDOMEN+PELVIS (CONTRAST ONLY) (CPT=74177)  COMPARISON:  None.  INDICATIONS:  N/V, seen at [facility] in [place] on thursday  TECHNIQUE:  CT scanning was performed from the dome of the diaphragm to the pubic symphysis with non-ionic intravenous contrast material. Post contrast coronal MPR imaging was performed.  Dose reduction techniques were used. Dose information is transmitted to the ACR (American College of Radiology) NRDR (National Radiology Data Registry) which includes the Dose Index Registry.  PATIENT STATED HISTORY:(As transcribed by Technologist)  Right upper quadrant pain with nausea, vomiting and diarrhea.   CONTRAST USED:  80cc of Omnipaque 350  FINDINGS:  LUNG BASE:  Atelectasis. LIVER:  Homogeneous enhancement. BILIARY:  No biliary ductal dilatation. PANCREAS:  Homogeneous enhancement. SPLEEN:  Normal caliber. KIDNEYS:  Large staghorn calculus in the right renal pelvis and mid superior and inferior pole calices measuring 6.2 x 2.7 cm with moderate caliectasis involving the right kidney.. ADRENALS:  Normal. AORTA/VASCULAR:  No aneurysm.  RETROPERITONEUM:  No enlarged adenopathy. BOWEL/MESENTERY:  Normal caliber appendix. Uncomplicated colonic diverticulosis ABDOMINAL WALL:  No ventral wall hernia. PELVIC ORGANS:  Normal for age. BONES:  Degenerative disc disease L4-5 L5-S1.  Multilevel endplate hypertrophic changes.  Mild degenerative changes in the lower lumbar facets.             CONCLUSION:  Large staghorn calculus in the right kidney with partial obstruction and mild to  moderate hydronephrosis involving the superior middle and inferior pole calices of the right kidney.  Normal caliber appendix.  Colonic diverticulosis.  LOCATION:  [facility]   Dictated by (CST): Lucila Kyle MD on 3/17/2024 at 10:53 PM     Finalized by (CST): Lucila Kyle MD on 3/17/2024 at 10:57 PM            ASSESSMENT / PLAN:    68 year old female with PMH including but not limited to DM, HTN, neuropathy who p/t EH for scheduled surgery by Dr. Martinez.   # Right Renal Calculus, Obstructed Right Nephrostomy Tube   -S/P Right Antegrade Nephrostogram, Attempted PCNL, Removal of Right Nephrostomy Tube   -cont plan per .  increase activity as tolerated   -IS 10x/hr for atelectasis; Instructed on use of IS  -PO food/fluid per surgery, bowel regimen as needed  -anselmo/post-op abx per surgery, on ancef    # Acute pain, expected  -IV pain meds as needed, will monitor and encourage transition to PO pain meds as tolerated  -Bowel regimen for associated narcotic constipation    # DM  - not on meds? Follow glc    # HTN  - not on meds? Follow BP    # Tob abuse  -Pt was counseled on smoking cessation especially c wound healing; discussed multiple strategies including nicotine replacement and pharmacologic management, a total of 5 min spent on 4/9/2024  -pt trying to quiting smoking this past year    # DVT proph  -per      Dispo: cont post-op care\    Outpatient records records reviewed including Agnes Sawyer MD note       Patient given opportunity to ask questions and note understanding  and agreeing with therapeutic plan as outlined. D/w RN       Thank you for allowing me to participate in the care of this patient.     Martin Soto MD  [facility] Hospitalist  Pager [phone]    4/8/2024  6:34 PM        Electronically signed by Martin Soto MD on 4/9/2024  1:13 AM           D/C Summary    No notes of this type exist for this encounter.     Physical Therapy Notes (last 72 hours)  Notes from 4/8/2024  3:42 PM through 4/11/2024  3:42 PM   No notes of this type exist for this encounter.     Occupational Therapy Notes (last 72 hours)  Notes from 4/8/2024  3:42 PM through 4/11/2024  3:42 PM   No notes of this type exist for this encounter.     Video Swallow Study Notes    No notes of this type exist for this encounter.     SLP Notes    No notes of this type exist for this encounter.     Immunizations     Name Date      Covid-19 Pfizer 04/04/22     Covid-19 Pfizer 03/14/22       Multidisciplinary Problems     Active Goals        Problem: Patient/Family Goals    Goal Priority Disciplines Outcome Interventions   Patient/Family Long Term Goal     Interdisciplinary Progressing    Description: Patient's Long Term Goal: ***    Interventions:  - ***  - See additional Care Plan goals for specific interventions   Patient/Family Short Term Goal     Interdisciplinary Progressing    Description: Patient's Short Term Goal: ***    Interventions:   - ***  - See additional Care Plan goals for specific interventions

## (undated) NOTE — LETTER
[address]  [address]  Authorization for Surgical Operation and Procedure     Date:___________                                                                                                         Time:__________  I hereby authorize Surgeon(s):  Jesus Martinez MD, my physician and his/her assistants (if applicable), which may include medical students, residents, and/or fellows, to perform the following surgical operation/ procedure and administer such anesthesia as may be determined necessary by my physician:  Operation/Procedure name (s) Procedure(s):  RIGHT ANTEGRADE PYELOGRAM, ATTEMPTED PERCUTANEOUS NEPHROLITHOTOMY on America Scott   2.   I recognize that during the surgical operation/procedure, unforeseen conditions may necessitate additional or different procedures than those listed above.  I, therefore, further authorize and request that the above-named surgeon, assistants, or designees perform such procedures as are, in their judgment, necessary and desirable.    3.   My surgeon/physician has discussed prior to my surgery the potential benefits, risks and side effects of this procedure; the likelihood of achieving goals; and potential problems that might occur during recuperation.  They also discussed reasonable alternatives to the procedure, including risks, benefits, and side effects related to the alternatives and risks related to not receiving this procedure.  I have had all my questions answered and I acknowledge that no guarantee has been made as to the result that may be obtained.    4.   Should the need arise during my operation/procedure, which includes change of level of care prior to discharge, I also consent to the administration of blood and/or blood products.  Further, I understand that despite careful testing and screening of blood or blood products by collecting agencies, I may still be subject to ill effects as a result of receiving a blood  transfusion and/or blood products.  The following are some, but not all, of the potential risks that can occur: fever and allergic reactions, hemolytic reactions, transmission of diseases such as Hepatitis, AIDS and Cytomegalovirus (CMV) and fluid overload.  In the event that I wish to have an autologous transfusion of my own blood, or a directed donor transfusion, I will discuss this with my physician.  Check only if Refusing Blood or Blood Products  I understand refusal of blood or blood products as deemed necessary by my physician may have serious consequences to my condition to include possible death. I hereby assume responsibility for my refusal and release the hospital, its personnel, and my physicians from any responsibility for the consequences of my refusal.          o  Refuse      5.   I authorize the use of any specimen, organs, tissues, body parts or foreign objects that may be removed from my body during the operation/procedure for diagnosis, research or teaching purposes and their subsequent disposal by hospital authorities.  I also authorize the release of specimen test results and/or written reports to my treating physician on the hospital medical staff or other referring or consulting physicians involved in my care, at the discretion of the Pathologist or my treating physician.    6.   I consent to the photographing or videotaping of the operations or procedures to be performed, including appropriate portions of my body for medical, scientific, or educational purposes, provided my identity is not revealed by the pictures or by descriptive texts accompanying them.  If the procedure has been photographed/videotaped, the surgeon will obtain the original picture, image, videotape or CD.  The hospital will not be responsible for storage, release or maintenance of the picture, image, tape or CD.    7.   I consent to the presence of a  or observers in the operating room as deemed  necessary by my physician or their designees.    8.   I recognize that in the event my procedure results in extended X-Ray/fluoroscopy time, I may develop a skin reaction.    9. If I have a Do Not Attempt Resuscitation (DNAR) order in place, that status will be suspended while in the operating room, procedural suite, and during the recovery period unless otherwise explicitly stated by me (or a person authorized to consent on my behalf). The surgeon or my attending physician will determine when the applicable recovery period ends for purposes of reinstating the DNAR order.  10. Patients having a sterilization procedure: I understand that if the procedure is successful the results will be permanent and it will therefore be impossible for me to inseminate, conceive, or bear children.  I also understand that the procedure is intended to result in sterility, although the result has not been guaranteed.   11. I acknowledge that my physician has explained sedation/analgesia administration to me including the risk and benefits I consent to the administration of sedation/analgesia as may be necessary or desirable in the judgment of my physician.    I CERTIFY THAT I HAVE READ AND FULLY UNDERSTAND THE ABOVE CONSENT TO OPERATION and/or OTHER PROCEDURE.    _________________________________________  __________________________________  Signature of Patient     Signature of Responsible Person         ___________________________________         Printed Name of Responsible Person           _________________________________                 Relationship to Patient  _________________________________________  ______________________________  Signature of Witness          Date  Time      Patient Name: America Scott     : 1955                 Printed: 2024     Medical Record #: [id number]                     Page 1 of 2                                    [address]   [address]    Consent for Anesthesia    [place] agree to be cared for by an anesthesiologist, who is specially trained to monitor me and give me medicine to put me to sleep or keep me comfortable during my procedure    I understand that my anesthesiologist is not an employee or agent of [facility] or [employer]. He or she works for [employer].    As the patient asking for anesthesia services, I agree to:  Allow the anesthesiologist (anesthesia doctor) to give me medicine and do additional procedures as necessary. Some examples are: Starting or using an “IV” to give me medicine, fluids or blood during my procedure, and having a breathing tube placed to help me breathe when I’m asleep (intubation). In the event that my heart stops working properly, I understand that my anesthesiologist will make every effort to sustain my life, unless otherwise directed by [facility] Do Not Resuscitate documents.  Tell my anesthesia doctor before my procedure:  If I am pregnant.  The last time that I ate or drank.  All of the medicines I take (including prescriptions, herbal supplements, and pills I can buy without a prescription (including street drugs/illegal medications). Failure to inform my anesthesiologist about these medicines may increase my risk of anesthetic complications.  If I am allergic to anything or have had a reaction to anesthesia before.  I understand how the anesthesia medicine will help me (benefits).  I understand that with any type of anesthesia medicine there are risks:  The most common risks are: nausea, vomiting, sore throat, muscle soreness, damage to my eyes, mouth, or teeth (from breathing tube placement).  Rare risks include: remembering what happened during my procedure, allergic reactions to medications, injury to my airway, heart, lungs, vision, nerves, or muscles and in extremely rare instances death.  My doctor has explained to me other choices  available to me for my care (alternatives).  Pregnant Patients (“epidural”):  I understand that the risks of having an epidural (medicine given into my back to help control pain during labor), include itching, low blood pressure, difficulty urinating, headache or slowing of the baby’s heart. Very rare risks include infection, bleeding, seizure, irregular heart rhythms and nerve injury.  Regional Anesthesia (“spinal”, “epidural”, & “nerve blocks”):  I understand that rare but potential complications include headache, bleeding, infection, seizure, irregular heart rhythms, and nerve injury.    I can change my mind about having anesthesia services at any time before I get the medicine.    _____________________________________________________________________________  Patient (or Representative) Signature/Relationship to Patient  Date   Time    _____________________________________________________________________________   Name (if used)    Language/Organization   Time    _____________________________________________________________________________  Anesthesiologist Signature     Date   Time  I have discussed the procedure and information above with the patient (or patient’s representative) and answered their questions. The patient or their representative has agreed to have anesthesia services.    _____________________________________________________________________________  Witness        Date   Time  I have verified that the signature is that of the patient or patient’s representative, and that it was signed before the procedure  Patient Name: America Scott     : 1955                 Printed: 2024     Medical Record #: [id number]                     Page 2 of 2

## (undated) NOTE — LETTER
[address]  [address]  Authorization for Surgical Operation and Procedure     Date:___________                                                                                                         Time:__________  I hereby authorize Surgeon(s): Jesus Martinez MD, my physician and his/her assistants (if applicable), which may include medical students, residents, and/or fellows, to perform the following surgical operation/ procedure and administer such anesthesia as may be determined necessary by my physician:  Operation/Procedure name (s) Procedure(s): CYSTOSCOPY RIGHT RETROGRADE PYELOGRAM AND RIGHT  URETERAL STENT INSERTION on America L Tyler   2.   I recognize that during the surgical operation/procedure, unforeseen conditions may necessitate additional or different procedures than those listed above.  I, therefore, further authorize and request that the above-named surgeon, assistants, or designees perform such procedures as are, in their judgment, necessary and desirable.    3.   My surgeon/physician has discussed prior to my surgery the potential benefits, risks and side effects of this procedure; the likelihood of achieving goals; and potential problems that might occur during recuperation.  They also discussed reasonable alternatives to the procedure, including risks, benefits, and side effects related to the alternatives and risks related to not receiving this procedure.  I have had all my questions answered and I acknowledge that no guarantee has been made as to the result that may be obtained.    4.   Should the need arise during my operation/procedure, which includes change of level of care prior to discharge, I also consent to the administration of blood and/or blood products.  Further, I understand that despite careful testing and screening of blood or blood products by collecting agencies, I may still be subject to ill effects as a result of receiving a blood  transfusion and/or blood products.  The following are some, but not all, of the potential risks that can occur: fever and allergic reactions, hemolytic reactions, transmission of diseases such as Hepatitis, AIDS and Cytomegalovirus (CMV) and fluid overload.  In the event that I wish to have an autologous transfusion of my own blood, or a directed donor transfusion, I will discuss this with my physician.  Check only if Refusing Blood or Blood Products  I understand refusal of blood or blood products as deemed necessary by my physician may have serious consequences to my condition to include possible death. I hereby assume responsibility for my refusal and release the hospital, its personnel, and my physicians from any responsibility for the consequences of my refusal.          o  Refuse      5.   I authorize the use of any specimen, organs, tissues, body parts or foreign objects that may be removed from my body during the operation/procedure for diagnosis, research or teaching purposes and their subsequent disposal by hospital authorities.  I also authorize the release of specimen test results and/or written reports to my treating physician on the hospital medical staff or other referring or consulting physicians involved in my care, at the discretion of the Pathologist or my treating physician.    6.   I consent to the photographing or videotaping of the operations or procedures to be performed, including appropriate portions of my body for medical, scientific, or educational purposes, provided my identity is not revealed by the pictures or by descriptive texts accompanying them.  If the procedure has been photographed/videotaped, the surgeon will obtain the original picture, image, videotape or CD.  The hospital will not be responsible for storage, release or maintenance of the picture, image, tape or CD.    7.   I consent to the presence of a  or observers in the operating room as deemed  necessary by my physician or their designees.    8.   I recognize that in the event my procedure results in extended X-Ray/fluoroscopy time, I may develop a skin reaction.    9. If I have a Do Not Attempt Resuscitation (DNAR) order in place, that status will be suspended while in the operating room, procedural suite, and during the recovery period unless otherwise explicitly stated by me (or a person authorized to consent on my behalf). The surgeon or my attending physician will determine when the applicable recovery period ends for purposes of reinstating the DNAR order.  10. Patients having a sterilization procedure: I understand that if the procedure is successful the results will be permanent and it will therefore be impossible for me to inseminate, conceive, or bear children.  I also understand that the procedure is intended to result in sterility, although the result has not been guaranteed.   11. I acknowledge that my physician has explained sedation/analgesia administration to me including the risk and benefits I consent to the administration of sedation/analgesia as may be necessary or desirable in the judgment of my physician.    I CERTIFY THAT I HAVE READ AND FULLY UNDERSTAND THE ABOVE CONSENT TO OPERATION and/or OTHER PROCEDURE.    _________________________________________  __________________________________  Signature of Patient     Signature of Responsible Person         ___________________________________         Printed Name of Responsible Person           _________________________________                 Relationship to Patient  _________________________________________  ______________________________  Signature of Witness          Date  Time      Patient Name: America Scott     : 1955                 Printed: 2024     Medical Record #: [id number]                     Page 1 of 2                                    [address]   [address]    Consent for Anesthesia    I, America Scott agree to be cared for by an anesthesiologist, who is specially trained to monitor me and give me medicine to put me to sleep or keep me comfortable during my procedure    I understand that my anesthesiologist is not an employee or agent of [facility] or [employer]. He or she works for [employer].    As the patient asking for anesthesia services, I agree to:  Allow the anesthesiologist (anesthesia doctor) to give me medicine and do additional procedures as necessary. Some examples are: Starting or using an “IV” to give me medicine, fluids or blood during my procedure, and having a breathing tube placed to help me breathe when I’m asleep (intubation). In the event that my heart stops working properly, I understand that my anesthesiologist will make every effort to sustain my life, unless otherwise directed by [facility] Do Not Resuscitate documents.  Tell my anesthesia doctor before my procedure:  If I am pregnant.  The last time that I ate or drank.  All of the medicines I take (including prescriptions, herbal supplements, and pills I can buy without a prescription (including street drugs/illegal medications). Failure to inform my anesthesiologist about these medicines may increase my risk of anesthetic complications.  If I am allergic to anything or have had a reaction to anesthesia before.  I understand how the anesthesia medicine will help me (benefits).  I understand that with any type of anesthesia medicine there are risks:  The most common risks are: nausea, vomiting, sore throat, muscle soreness, damage to my eyes, mouth, or teeth (from breathing tube placement).  Rare risks include: remembering what happened during my procedure, allergic reactions to medications, injury to my airway, heart, lungs, vision, nerves, or muscles and in extremely rare instances death.  My doctor has explained to me other choices  available to me for my care (alternatives).  Pregnant Patients (“epidural”):  I understand that the risks of having an epidural (medicine given into my back to help control pain during labor), include itching, low blood pressure, difficulty urinating, headache or slowing of the baby’s heart. Very rare risks include infection, bleeding, seizure, irregular heart rhythms and nerve injury.  Regional Anesthesia (“spinal”, “epidural”, & “nerve blocks”):  I understand that rare but potential complications include headache, bleeding, infection, seizure, irregular heart rhythms, and nerve injury.    I can change my mind about having anesthesia services at any time before I get the medicine.    _____________________________________________________________________________  Patient (or Representative) Signature/Relationship to Patient  Date   Time    _____________________________________________________________________________   Name (if used)    Language/Organization   Time    _____________________________________________________________________________  Anesthesiologist Signature     Date   Time  I have discussed the procedure and information above with the patient (or patient’s representative) and answered their questions. The patient or their representative has agreed to have anesthesia services.    _____________________________________________________________________________  Witness        Date   Time  I have verified that the signature is that of the patient or patient’s representative, and that it was signed before the procedure  Patient Name: America Scott     : 1955                 Printed: 2024     Medical Record #: [id number]                     Page 2 of 2

## (undated) NOTE — LETTER
[address]  [address]  Consent for Procedure/Sedation  Date: 4/8/24         Time: 1515    I hereby authorize Dr Tim, my physician and his/her assistants (if applicable), which may include medical students, residents, and/or fellows, to perform the following surgical operation/ procedure and administer such anesthesia as may be determined necessary by my physician: Right Nephrostomy Tube Insertion or Wire Insertion  on America Scott  2.   I recognize that during the surgical operation/procedure, unforeseen conditions may necessitate additional or different procedures than those listed above.  I, therefore, further authorize and request that the above-named surgeon, assistants, or designees perform such procedures as are, in their judgment, necessary and desirable.    3.   My surgeon/physician has discussed prior to my surgery the potential benefits, risks and side effects of this procedure; the likelihood of achieving goals; and potential problems that might occur during recuperation.  They also discussed reasonable alternatives to the procedure, including risks, benefits, and side effects related to the alternatives and risks related to not receiving this procedure.  I have had all my questions answered and I acknowledge that no guarantee has been made as to the result that may be obtained.    4.   Should the need arise during my operation/procedure, which includes change of level of care prior to discharge, I also consent to the administration of blood and/or blood products.  Further, I understand that despite careful testing and screening of blood or blood products by collecting agencies, I may still be subject to ill effects as a result of receiving a blood transfusion and/or blood products.  The following are some, but not all, of the potential risks that can occur: fever and allergic reactions, hemolytic reactions, transmission of diseases such as Hepatitis, AIDS and  Cytomegalovirus (CMV) and fluid overload.  In the event that I wish to have an autologous transfusion of my own blood, or a directed donor transfusion, I will discuss this with my physician.   Check only if Refusing Blood or Blood Products  I understand refusal of blood or blood products as deemed necessary by my physician may have serious consequences to my condition to include possible death. I hereby assume responsibility for my refusal and release the hospital, its personnel, and my physicians from any responsibility for the consequences of my refusal.         o  Refuse         5.   I authorize the use of any specimen, organs, tissues, body parts or foreign objects that may be removed from my body during the operation/procedure for diagnosis, research or teaching purposes and their subsequent disposal by hospital authorities.  I also authorize the release of specimen test results and/or written reports to my treating physician on the hospital medical staff or other referring or consulting physicians involved in my care, at the discretion of the Pathologist or my treating physician.    6.   I consent to the photographing or videotaping of the operations or procedures to be performed, including appropriate portions of my body for medical, scientific, or educational purposes, provided my identity is not revealed by the pictures or by descriptive texts accompanying them.  If the procedure has been photographed/videotaped, the surgeon will obtain the original picture, image, videotape or CD.  The hospital will not be responsible for storage, release or maintenance of the picture, image, tape or CD.    7.   I consent to the presence of a  or observers in the operating room as deemed necessary by my physician or their designees.    8.   I recognize that in the event my procedure results in extended X-Ray/fluoroscopy time, I may develop a skin reaction.    9. If I have a Do Not Attempt Resuscitation  (DNAR) order in place, that status will be suspended while in the operating room, procedural suite, and during the recovery period unless otherwise explicitly stated by me (or a person authorized to consent on my behalf). The surgeon or my attending physician will determine when the applicable recovery period ends for purposes of reinstating the DNAR order.  10. Patients having a sterilization procedure: I understand that if the procedure is successful the results will be permanent and it will therefore be impossible for me to inseminate, conceive, or bear children.  I also understand that the procedure is intended to result in sterility, although the result has not been guaranteed.   11. I acknowledge that my physician has explained sedation/analgesia administration to me including the risk and benefits I consent to the administration of sedation/analgesia as may be necessary or desirable in the judgment of my physician.    I CERTIFY THAT I HAVE READ AND FULLY UNDERSTAND THE ABOVE CONSENT TO OPERATION and/or OTHER PROCEDURE.        ____________________________________       _________________________________      ______________________________  Signature of Patient         Signature of Responsible Person        Printed Name of Responsible Person        ____________________________________      _________________________________      ______________________________       Signature of Witness          Relationship to Patient                       Date                                       Time  Patient Name: America Scott  : 1955    Reviewed: 2024   Printed: 2024  Medical Record #: [id number] Page 1 of 1